# Patient Record
Sex: FEMALE | Race: WHITE | Employment: PART TIME | URBAN - METROPOLITAN AREA
[De-identification: names, ages, dates, MRNs, and addresses within clinical notes are randomized per-mention and may not be internally consistent; named-entity substitution may affect disease eponyms.]

---

## 2020-09-09 ENCOUNTER — INITIAL CONSULT (OUTPATIENT)
Dept: NEUROSURGERY | Age: 20
End: 2020-09-09
Payer: COMMERCIAL

## 2020-09-09 VITALS
TEMPERATURE: 99.3 F | BODY MASS INDEX: 28.25 KG/M2 | DIASTOLIC BLOOD PRESSURE: 71 MMHG | SYSTOLIC BLOOD PRESSURE: 123 MMHG | HEIGHT: 67 IN | WEIGHT: 180 LBS | HEART RATE: 67 BPM

## 2020-09-09 PROCEDURE — 99203 OFFICE O/P NEW LOW 30 MIN: CPT | Performed by: PHYSICIAN ASSISTANT

## 2020-09-09 SDOH — HEALTH STABILITY: MENTAL HEALTH: HOW OFTEN DO YOU HAVE A DRINK CONTAINING ALCOHOL?: NEVER

## 2020-09-09 ASSESSMENT — ENCOUNTER SYMPTOMS
ABDOMINAL PAIN: 0
PHOTOPHOBIA: 0
SHORTNESS OF BREATH: 0
TROUBLE SWALLOWING: 0
BACK PAIN: 1

## 2020-09-09 NOTE — PROGRESS NOTES
Subjective:      Patient ID: Tita Rodriguez is a 21 y.o. female. Jay Jay Coulter is a 21year old female with a past medical history of scoliosis. She presents to the office today with her mother c/o worsening right sided mid back pain. Describes the pain as intermittent, sharp, and burning. Pain radiates underneath her right arm stopping at the elbow. Any type of physical activity makes the pain worse. She has tried physical therapy without significant relief. She has seen an orthopedic surgeon in [facility] who recommended she see the pain clinic and our neurosurgical services. No recent imaging to review. Denies numbness, tingling, loss of bowel or bladder, saddle anesthesia, fever, chills, N/V, SOB, or chest pain. Review of Systems   Constitutional: Negative for fever. HENT: Negative for trouble swallowing. Eyes: Negative for photophobia. Respiratory: Negative for shortness of breath. Cardiovascular: Negative for chest pain. Gastrointestinal: Negative for abdominal pain. Endocrine: Negative for heat intolerance. Genitourinary: Negative for flank pain. Musculoskeletal: Positive for back pain and myalgias. Skin: Negative for wound. Neurological: Negative for weakness, numbness and headaches. Psychiatric/Behavioral: Negative for confusion. Objective:   Physical Exam  Constitutional:       Appearance: Normal appearance. She is well-developed. HENT:      Head: Normocephalic and atraumatic. Eyes:      Extraocular Movements: Extraocular movements intact. Conjunctiva/sclera: Conjunctivae normal.      Pupils: Pupils are equal, round, and reactive to light. Neck:      Musculoskeletal: Normal range of motion and neck supple. Cardiovascular:      Rate and Rhythm: Normal rate. Pulmonary:      Effort: Pulmonary effort is normal.   Abdominal:      General: There is no distension.    Musculoskeletal:      Comments: Tenderness to palpation of right sided cervicothoracic spine  Right thoracic prominence noted   Skin:     General: Skin is warm and dry. Neurological:      Mental Status: She is alert. Comments: Alert and oriented x3  CN3-12 intact  Motor strength full  Sensation intact to light touch  Reflexes normal    Psychiatric:         Thought Content: Thought content normal.         Assessment: This is a 21year old female presenting with worsening right sided thoracic pain. Hx scoliosis. Failed PT.        Plan:      -Obtain MRI thoracic spine and standing scoliosis films for baseline imaging   -OARRS report reviewed  -Call/return to Neurosurgery clinic after completion of imaging to discuss results and further treatment plan  -Call/return sooner if symptoms worsen or new issues arise in the interim           Maulik Perry PA-C

## 2020-09-22 ENCOUNTER — TELEPHONE (OUTPATIENT)
Dept: NEUROSURGERY | Age: 20
End: 2020-09-22

## 2020-09-22 NOTE — TELEPHONE ENCOUNTER
No auth required for MRI of thoracic spine per Kiran Goff with [id number] ( [phone] ).  Ref#  [id number]

## 2020-09-30 ENCOUNTER — HOSPITAL ENCOUNTER (OUTPATIENT)
Dept: MRI IMAGING | Age: 20
Discharge: HOME | End: 2020-10-02
Payer: COMMERCIAL

## 2020-09-30 PROCEDURE — 72146 MRI CHEST SPINE W/O DYE: CPT

## 2020-10-07 ENCOUNTER — HOSPITAL ENCOUNTER (OUTPATIENT)
Dept: GENERAL RADIOLOGY | Age: 20
Discharge: HOME | End: 2020-10-09
Payer: COMMERCIAL

## 2020-10-07 PROCEDURE — 72082 X-RAY EXAM ENTIRE SPI 2/3 VW: CPT

## 2020-10-12 ENCOUNTER — TELEPHONE (OUTPATIENT)
Dept: NEUROSURGERY | Age: 20
End: 2020-10-12

## 2021-05-06 ENCOUNTER — OFFICE VISIT (OUTPATIENT)
Dept: FAMILY MEDICINE CLINIC | Age: 21
End: 2021-05-06
Payer: COMMERCIAL

## 2021-05-06 ENCOUNTER — TELEPHONE (OUTPATIENT)
Dept: FAMILY MEDICINE CLINIC | Age: 21
End: 2021-05-06

## 2021-05-06 VITALS
SYSTOLIC BLOOD PRESSURE: 118 MMHG | HEART RATE: 96 BPM | OXYGEN SATURATION: 98 % | WEIGHT: 194.6 LBS | HEIGHT: 67 IN | DIASTOLIC BLOOD PRESSURE: 76 MMHG | TEMPERATURE: 98.1 F | BODY MASS INDEX: 30.54 KG/M2

## 2021-05-06 DIAGNOSIS — G97.82 OTHER POSTOPERATIVE COMPLICATION INVOLVING NERVOUS SYSTEM: ICD-10-CM

## 2021-05-06 DIAGNOSIS — R43.9 SENSE OF SMELL ALTERED: ICD-10-CM

## 2021-05-06 DIAGNOSIS — R43.2 ALTERED TASTE: Primary | ICD-10-CM

## 2021-05-06 PROCEDURE — 99203 OFFICE O/P NEW LOW 30 MIN: CPT | Performed by: NURSE PRACTITIONER

## 2021-05-06 RX ORDER — NORGESTIMATE AND ETHINYL ESTRADIOL 7DAYSX3 28
KIT ORAL
COMMUNITY
Start: 2021-04-26 | End: 2022-04-27

## 2021-05-06 RX ORDER — PREDNISONE 20 MG/1
20 TABLET ORAL 2 TIMES DAILY
Qty: 10 TABLET | Refills: 0 | Status: SHIPPED | OUTPATIENT
Start: 2021-05-06 | End: 2021-05-11

## 2021-05-06 RX ORDER — ACETAMINOPHEN 500 MG
500 TABLET ORAL EVERY 6 HOURS PRN
COMMUNITY

## 2021-05-06 RX ORDER — PREDNISONE 20 MG/1
20 TABLET ORAL 2 TIMES DAILY
Qty: 10 TABLET | Refills: 0 | Status: SHIPPED
Start: 2021-05-06 | End: 2021-05-06

## 2021-05-06 RX ORDER — VIT C/VIT D3/E/ZINC/ELDERBERRY 65 MG-3.15
TABLET,CHEWABLE ORAL DAILY
COMMUNITY

## 2021-05-06 ASSESSMENT — PATIENT HEALTH QUESTIONNAIRE - PHQ9
SUM OF ALL RESPONSES TO PHQ QUESTIONS 1-9: 0
SUM OF ALL RESPONSES TO PHQ QUESTIONS 1-9: 0
SUM OF ALL RESPONSES TO PHQ9 QUESTIONS 1 & 2: 0
2. FEELING DOWN, DEPRESSED OR HOPELESS: 0

## 2021-05-06 ASSESSMENT — VISUAL ACUITY: OU: 1

## 2021-05-06 ASSESSMENT — ENCOUNTER SYMPTOMS
ABDOMINAL PAIN: 0
ANAL BLEEDING: 0
COLOR CHANGE: 0
BLOOD IN STOOL: 0
RECTAL PAIN: 0

## 2021-05-06 NOTE — TELEPHONE ENCOUNTER
Okay sounds good I did leave a packet of stuff with you just let her know it is there and that I had like to follow-up with her in a week

## 2021-05-06 NOTE — PROGRESS NOTES
Lidia Duarte : 2000 Sex: female  Age: 24 y.o. Chief Complaint   Patient presents with    Other     taste and smell have been strange since her surgery in march, everything taste like metal     New Patient       Assessment and Plan:  There are no diagnoses linked to this encounter. No follow-ups on file. Educational materials printed for patient's review and were included in patient instructions on her After Visit Summary and given to patient at the end of visit. Counseled regarding above diagnosis, including possible risks and complications,  especially if left uncontrolled. Counseled regarding the possible side effects, risks, benefits and alternatives to treatment; patient and/or guardian verbalizes understanding, agrees, feels comfortable with and wishes to proceed with above treatment plan. Advised patient to call with any new medication issues, and read all Rx info from pharmacy to assure aware of all possible risks and side effects of medication before taking. Reviewed age and gender appropriate health screening exams and vaccinations. Advised patient regarding importance of keeping up with recommended health maintenance and to schedule as soon as possible if overdue, as this is important in assessing for undiagnosed pathology, especially cancer, as well as protecting against potentially harmful/life threatening disease. Patient verbalizes understanding and agrees with above counseling, assessment and plan. All questions answered. On 21 I have spent 30 reviewing previous notes, test results and face to face with the patient discussing the diagnosis and importance of compliance with the treatment plan as well as documenting on the day of the visit.   Educational materials exercises printed for patient's review and were included in patient instructions on their After Visit Summary and given to patient at the end of visit.      USPTF:     (  ) HIV: Screening - Adolescents and Adults  Grade: A (Recommended) recommends that clinicians screen for HIV infection in ages 13 to 72 years. (  ) Hepatitis C Virus Infection: Screening--Adults at High Risk and Adults born between 1945 and 1965  Grade: B (Recommended) recommends screening for hepatitis C virus (HCV) infection in persons at high risk for infection. The USPSTF also recommends offering 1-time screening for HCV infection to adults born between 80 and 1965. (Blood pressure 118/76) High Blood Pressure: Screening and Home Monitoring -- Adults  Grade: A (Recommended) recommends screening for high blood pressure in ages 25 years or older. obtain measurements outside of the clinical setting for diagnostic confirmation before starting treatment. Annual screening for adults aged 36 years or older or those who are at increased risk for blood pressure    (  )  Lipid Disorders in Adults: Screening -- Men 28 and Older  Grade: A (Recommended) recommends screening men aged 28 and older for lipid disorders. (Social) Alcohol Misuse: Screening and Behavioral Counseling Interventions in Primary Care -- Adults  Grade: B (Recommended) recommends that clinicians screen adults aged 25 years or older for alcohol misuse and provide persons engaged in risky or hazardous drinking with brief behavioral counseling interventions to reduce alcohol misuse. (  ) Abnormal Blood Glucose and Type 2 Diabetes Mellitus: Screening -- Adults aged 36 to 79 years who are overweight or obese Grade: B (Recommended)    (BMI 30.25)  Obesity: Screening for and Management of-- All Adults  Grade: B(Recommended) recommends screening all adults for obesity. Clinicians should offer or refer patients with a body mass index (BMI) of 30 kg/m2 or higher to intensive, multicomponent behavioral interventions.          (Not a fall risk)  Fall Prevention -- Exercise/Physical Therapy: Community-dwelling Adults 72 Years or Older, Increased Risk for Falls   Grade: B (Recommended) recommends exercise or physical therapy to prevent falls in community-dwelling adults aged 72 years or older who are at increased risk for falls. (No new symptoms noted or reported today)  Depression: Screening -- General adult population, including pregnant and postpartum women  Grade: B(Recommended) recommends screening for depression in the general adult population,  Screening should be implemented with adequate systems in place to ensure accurate diagnosis, effective treatment, and appropriate follow-up. (  ) Thyroid Dysfunction: Screening --      (2020) Cervical Cancer: Screening -- Women 21 to 72 (Pap Smear) or 30-65 (in combo with HPV testing)  Grade: A(Recommended)    (  ) Breast Cancer: Screening with Mammography-- Women aged 48 to 76 years  Grade: B (Recommended) recommends biennial screening mammography for women aged 48 to 76 years. Date of surgery March 17 th, Symptoms started 24 hours after surgery. It has only progressed after that started with smell and taste. Described as moldy food. Was able to tolerate anything dairy. Biggest triggers meat, smoke, Gas Fumes and chemicals like bleach. Was told it should subside within two weeks but it has only progressed. She gets very nauseous and vomits with her biggest triggers. No major weight gain or loss reported.         Med's Used in Anesthesia     sevoflurane (ULTANE) inhalation charge 1 Each  propofol (DIPRIVAN/PROPOVEN) injection 200 mg  propofol (DIPRIVAN/PROPOVEN) continuous infusion Standard Conc 10mg/mL 1,158,235 mcg  fentaNYL (SUBLIMAZE) 50 mcg/mL injection 75 mcg  remifentanil (ULTIVA) 1 mg injection 2,772.33 mcg  midazolam (VERSED) 1 mg/mL IV 2 mg  rocuronium (ZEMURON) 10 mg/ml injection 50 mg  dexamethasone (DECADRON) injection 4 mg/mL 4 mg  ondansetron (ZOFRAN) 2 mg/mL injection 8 mg  ketorolac (TORADOL) IV             15 mg  acetaminophen (OFIRMEV) IV 1,000 mg  lidocaine 1% injection on file    Years of education: Not on file    Highest education level: Not on file   Occupational History    Not on file   Social Needs    Financial resource strain: Not on file    Food insecurity     Worry: Not on file     Inability: Not on file    Transportation needs     Medical: Not on file     Non-medical: Not on file   Tobacco Use    Smoking status: Never Smoker    Smokeless tobacco: Never Used   Substance and Sexual Activity    Alcohol use: Never     Frequency: Never     Binge frequency: Never    Drug use: Never    Sexual activity: Not on file   Lifestyle    Physical activity     Days per week: Not on file     Minutes per session: Not on file    Stress: Not on file   Relationships    Social connections     Talks on phone: Not on file     Gets together: Not on file     Attends Mosque service: Not on file     Active member of club or organization: Not on file     Attends meetings of clubs or organizations: Not on file     Relationship status: Not on file    Intimate partner violence     Fear of current or ex partner: Not on file     Emotionally abused: Not on file     Physically abused: Not on file     Forced sexual activity: Not on file   Other Topics Concern    Not on file   Social History Narrative    Not on file       Vitals:    05/06/21 1311   BP: 118/76   Site: Left Upper Arm   Position: Sitting   Cuff Size: Medium Adult   Pulse: 96   Temp: 98.1 °F (36.7 °C)   TempSrc: Temporal   SpO2: 98%   Weight: 194 lb 9.6 oz (88.3 kg)   Height: 5' 7.25\" (1.708 m)       Physical Exam  Constitutional:       Appearance: Normal appearance. She is normal weight. HENT:      Head: Normocephalic and atraumatic. Right Ear: Tympanic membrane and ear canal normal. No decreased hearing noted. No drainage or tenderness. No middle ear effusion. There is no impacted cerumen. No foreign body. Tympanic membrane is not injected, scarred, perforated or erythematous.       Left Ear: Tympanic membrane normal. No decreased hearing noted. No drainage or tenderness. No middle ear effusion. There is no impacted cerumen. No foreign body. Tympanic membrane is not injected, scarred, perforated or erythematous. Nose: No congestion or rhinorrhea. Right Sinus: No maxillary sinus tenderness or frontal sinus tenderness. Left Sinus: No maxillary sinus tenderness or frontal sinus tenderness. Mouth/Throat:      Mouth: Mucous membranes are dry. Pharynx: Oropharynx is clear. No oropharyngeal exudate or posterior oropharyngeal erythema. Eyes:      General: Lids are normal. Vision grossly intact. No allergic shiner. Right eye: No discharge. Left eye: No discharge. Extraocular Movements: Extraocular movements intact. Right eye: No nystagmus. Left eye: No nystagmus. Conjunctiva/sclera: Conjunctivae normal.      Right eye: Right conjunctiva is not injected. No exudate or hemorrhage. Left eye: Left conjunctiva is not injected. No exudate or hemorrhage. Pupils: Pupils are equal, round, and reactive to light. Neck:      Musculoskeletal: Normal range of motion. No neck rigidity or muscular tenderness. Thyroid: No thyromegaly. Meningeal: Brudzinski's sign and Kernig's sign absent. Cardiovascular:      Rate and Rhythm: Normal rate and regular rhythm. Heart sounds: Normal heart sounds. Pulmonary:      Effort: No retractions. Breath sounds: No decreased breath sounds, wheezing, rhonchi or rales. Abdominal:      Tenderness: There is no abdominal tenderness. Musculoskeletal: Normal range of motion. Lymphadenopathy:      Cervical: No cervical adenopathy. Right cervical: No superficial, deep or posterior cervical adenopathy. Left cervical: No superficial, deep or posterior cervical adenopathy. Skin:     General: Skin is warm. Neurological:      Mental Status: She is alert.               Seen By:  Syliva Kawasaki, APRN - [facility]

## 2021-05-12 ENCOUNTER — OFFICE VISIT (OUTPATIENT)
Dept: FAMILY MEDICINE CLINIC | Age: 21
End: 2021-05-12
Payer: COMMERCIAL

## 2021-05-12 VITALS
HEIGHT: 67 IN | DIASTOLIC BLOOD PRESSURE: 80 MMHG | OXYGEN SATURATION: 98 % | BODY MASS INDEX: 30.26 KG/M2 | WEIGHT: 192.8 LBS | HEART RATE: 99 BPM | SYSTOLIC BLOOD PRESSURE: 118 MMHG | TEMPERATURE: 97.1 F

## 2021-05-12 DIAGNOSIS — G97.82 OTHER POSTOPERATIVE COMPLICATION INVOLVING NERVOUS SYSTEM: ICD-10-CM

## 2021-05-12 DIAGNOSIS — R43.2 ALTERED TASTE: Primary | ICD-10-CM

## 2021-05-12 DIAGNOSIS — R43.9 SENSE OF SMELL ALTERED: ICD-10-CM

## 2021-05-12 PROCEDURE — 96372 THER/PROPH/DIAG INJ SC/IM: CPT | Performed by: NURSE PRACTITIONER

## 2021-05-12 PROCEDURE — 99213 OFFICE O/P EST LOW 20 MIN: CPT | Performed by: NURSE PRACTITIONER

## 2021-05-12 RX ORDER — TRIAMCINOLONE ACETONIDE 40 MG/ML
40 INJECTION, SUSPENSION INTRA-ARTICULAR; INTRAMUSCULAR ONCE
Status: COMPLETED | OUTPATIENT
Start: 2021-05-12 | End: 2021-05-12

## 2021-05-12 RX ADMIN — TRIAMCINOLONE ACETONIDE 40 MG: 40 INJECTION, SUSPENSION INTRA-ARTICULAR; INTRAMUSCULAR at 09:50

## 2021-05-12 ASSESSMENT — ENCOUNTER SYMPTOMS
ABDOMINAL PAIN: 0
ANAL BLEEDING: 0
RECTAL PAIN: 0
BLOOD IN STOOL: 0
COLOR CHANGE: 0

## 2021-05-12 ASSESSMENT — VISUAL ACUITY: OU: 1

## 2021-05-12 NOTE — PROGRESS NOTES
Jessika Smileya : 2000 Sex: female  Age: 24 y.o. Chief Complaint   Patient presents with    Other     one week follow up on her altered taste, she states she has noticed some improvement . Assessment and Plan:  Ev Barrera was seen today for other. Diagnoses and all orders for this visit:    Altered taste  Comments: We will give her 1 cc of Kenalog 40 and see if this continues to improve and diminish her sensitivity to smells and taste    Sense of smell altered    Other postoperative complication involving nervous system    Other orders  -     triamcinolone acetonide (KENALOG-40) injection 40 mg        Return if symptoms worsen or fail to improve. Educational materials printed for patient's review and were included in patient instructions on her After Visit Summary and given to patient at the end of visit. Counseled regarding above diagnosis, including possible risks and complications,  especially if left uncontrolled. Counseled regarding the possible side effects, risks, benefits and alternatives to treatment; patient and/or guardian verbalizes understanding, agrees, feels comfortable with and wishes to proceed with above treatment plan. Advised patient to call with any new medication issues, and read all Rx info from pharmacy to assure aware of all possible risks and side effects of medication before taking. Reviewed age and gender appropriate health screening exams and vaccinations. Advised patient regarding importance of keeping up with recommended health maintenance and to schedule as soon as possible if overdue, as this is important in assessing for undiagnosed pathology, especially cancer, as well as protecting against potentially harmful/life threatening disease. Patient verbalizes understanding and agrees with above counseling, assessment and plan. All questions answered.     On 21 I have spent 30 reviewing previous notes, test results and face to face with the patient discussing the diagnosis and importance of compliance with the treatment plan as well as documenting on the day of the visit. Educational materials exercises printed for patient's review and were included in patient instructions on their After Visit Summary and given to patient at the end of visit.      USPTF:     (  ) HIV: Screening - Adolescents and Adults  Grade: A (Recommended) recommends that clinicians screen for HIV infection in ages 13 to 72 years. (  ) Hepatitis C Virus Infection: Screening--Adults at High Risk and Adults born between 1945 and 1965  Grade: B (Recommended) recommends screening for hepatitis C virus (HCV) infection in persons at high risk for infection. The USPSTF also recommends offering 1-time screening for HCV infection to adults born between 80 and 1965. (Blood pressure 118/80) High Blood Pressure: Screening and Home Monitoring -- Adults  Grade: A (Recommended) recommends screening for high blood pressure in ages 25 years or older. obtain measurements outside of the clinical setting for diagnostic confirmation before starting treatment. Annual screening for adults aged 36 years or older or those who are at increased risk for blood pressure    (  )  Lipid Disorders in Adults: Screening -- Men 28 and Older  Grade: A (Recommended) recommends screening men aged 28 and older for lipid disorders. (Social) Alcohol Misuse: Screening and Behavioral Counseling Interventions in Primary Care -- Adults  Grade: B (Recommended) recommends that clinicians screen adults aged 25 years or older for alcohol misuse and provide persons engaged in risky or hazardous drinking with brief behavioral counseling interventions to reduce alcohol misuse.      (  ) Abnormal Blood Glucose and Type 2 Diabetes Mellitus: Screening -- Adults aged 36 to 79 years who are overweight or obese Grade: B (Recommended)    (BMI 30.25)  Obesity: Screening for and Management of-- All Adults  Grade: B(Recommended) recommends screening all adults for obesity. Clinicians should offer or refer patients with a body mass index (BMI) of 30 kg/m2 or higher to intensive, multicomponent behavioral interventions. (Not a fall risk)  Fall Prevention -- Exercise/Physical Therapy: Community-dwelling Adults 72 Years or Older, Increased Risk for Falls   Grade: B (Recommended) recommends exercise or physical therapy to prevent falls in community-dwelling adults aged 72 years or older who are at increased risk for falls. (No new symptoms noted or reported today)  Depression: Screening -- General adult population, including pregnant and postpartum women  Grade: B(Recommended) recommends screening for depression in the general adult population,  Screening should be implemented with adequate systems in place to ensure accurate diagnosis, effective treatment, and appropriate follow-up. (  ) Thyroid Dysfunction: Screening --      (2020) Cervical Cancer: Screening -- Women 21 to 72 (Pap Smear) or 30-65 (in combo with HPV testing)  Grade: A(Recommended)    (  ) Breast Cancer: Screening with Mammography-- Women aged 48 to 76 years  Grade: B (Recommended) recommends biennial screening mammography for women aged 48 to 76 years. After only 1 course of prednisone she is noticing a big improvement in her triggers. No new symptomology reported. Date of surgery March 17 th, Symptoms started 24 hours after surgery. It has only progressed after that started with smell and taste. Described as moldy food. Was able to tolerate anything dairy. Biggest triggers meat, smoke, Gas Fumes and chemicals like bleach. Was told it should subside within two weeks but it has only progressed. She gets very nauseous and vomits with her biggest triggers. No major weight gain or loss reported.         Med's Used in Anesthesia     sevoflurane (ULTANE) inhalation charge 1 Each  propofol (DIPRIVAN/PROPOVEN) injection 200 mg  propofol (DIPRIVAN/PROPOVEN) continuous infusion Standard Conc 10mg/mL 1,158,235 mcg  fentaNYL (SUBLIMAZE) 50 mcg/mL injection 75 mcg  remifentanil (ULTIVA) 1 mg injection 2,772.33 mcg  midazolam (VERSED) 1 mg/mL IV 2 mg  rocuronium (ZEMURON) 10 mg/ml injection 50 mg  dexamethasone (DECADRON) injection 4 mg/mL 4 mg  ondansetron (ZOFRAN) 2 mg/mL injection 8 mg  ketorolac (TORADOL) IV             15 mg  acetaminophen (OFIRMEV) IV 1,000 mg  lidocaine 1% injection                         50 mg  ceFAZolin (ANCEF) 1000 mg vial 4,000 mg  phenylephrine 10 mg/mL injection 280 mcg  calcium chloride 10% (100 mg/mL) injection 1,000 mg  morphine (PF) 1mg/ml injection 450 mcg  morphine (PF) 1mg/ml injection 4 mg  glycopyrrolate (ROBINUL)                  2 mg/mL injection    0.2 mcg  neostigmine (BLOXIVERZ)                 1 mg/mL injection    1 mg  dexmedetomidine (PRECEDEX) 4 mcg/mL injection               12 mcg    Other  Pertinent negatives include no abdominal pain, chest pain, fever or headaches. Review of Systems   Constitutional: Negative for activity change and fever. HENT: Negative for nosebleeds. Cardiovascular: Negative for chest pain, palpitations and leg swelling. Gastrointestinal: Negative for abdominal pain, anal bleeding, blood in stool and rectal pain. Genitourinary: Negative for hematuria. Skin: Negative for color change and pallor. Neurological: Negative for light-headedness and headaches. Hematological: Does not bruise/bleed easily.          Current Outpatient Medications:     acetaminophen (TYLENOL) 500 MG tablet, Take 500 mg by mouth every 6 hours as needed for Pain, Disp: , Rfl:     Misc Natural Products (AIRBORNE ELDERBERRY) CHEW, Take by mouth daily, Disp: , Rfl:     Ascorbic Acid (VITAMIN C) 500 MG CHEW, Take by mouth daily, Disp: , Rfl:     Norgestim-Eth Estrad Triphasic 0.18/0.215/0.25 MG-35 MCG TABS, take 1 tablet by mouth once daily, Disp: , Rfl:   Allergies   Allergen Reactions    Morphine Nausea And Vomiting       History reviewed. No pertinent past medical history.   Past Surgical History:   Procedure Laterality Date    SPINAL FUSION  03/17/2021     Family History   Problem Relation Age of Onset    Rheum Arthritis Mother     Rheum Arthritis Father     Rashes/Skin Problems Brother      Social History     Socioeconomic History    Marital status: Single     Spouse name: Not on file    Number of children: Not on file    Years of education: Not on file    Highest education level: Not on file   Occupational History    Not on file   Social Needs    Financial resource strain: Not on file    Food insecurity     Worry: Not on file     Inability: Not on file    Transportation needs     Medical: Not on file     Non-medical: Not on file   Tobacco Use    Smoking status: Never Smoker    Smokeless tobacco: Never Used   Substance and Sexual Activity    Alcohol use: Never     Frequency: Never     Binge frequency: Never    Drug use: Never    Sexual activity: Not on file   Lifestyle    Physical activity     Days per week: Not on file     Minutes per session: Not on file    Stress: Not on file   Relationships    Social connections     Talks on phone: Not on file     Gets together: Not on file     Attends Bahai service: Not on file     Active member of club or organization: Not on file     Attends meetings of clubs or organizations: Not on file     Relationship status: Not on file    Intimate partner violence     Fear of current or ex partner: Not on file     Emotionally abused: Not on file     Physically abused: Not on file     Forced sexual activity: Not on file   Other Topics Concern    Not on file   Social History Narrative    Not on file       Vitals:    05/12/21 0909   BP: 118/80   Site: Left Upper Arm   Position: Sitting   Cuff Size: Medium Adult   Pulse: 99   Temp: 97.1 °F (36.2 °C)   TempSrc: Temporal   SpO2: 98%   Weight: 192 lb 12.8 oz (87.5 kg)   Height: 5' 7.25\" (1.708 m)       Physical Exam  Constitutional:       Appearance: Normal appearance. She is normal weight. HENT:      Head: Normocephalic and atraumatic. Right Ear: Tympanic membrane and ear canal normal. No decreased hearing noted. No drainage or tenderness. No middle ear effusion. There is no impacted cerumen. No foreign body. Tympanic membrane is not injected, scarred, perforated or erythematous. Left Ear: Tympanic membrane normal. No decreased hearing noted. No drainage or tenderness. No middle ear effusion. There is no impacted cerumen. No foreign body. Tympanic membrane is not injected, scarred, perforated or erythematous. Nose: No congestion or rhinorrhea. Right Sinus: No maxillary sinus tenderness or frontal sinus tenderness. Left Sinus: No maxillary sinus tenderness or frontal sinus tenderness. Mouth/Throat:      Mouth: Mucous membranes are dry. Pharynx: Oropharynx is clear. No oropharyngeal exudate or posterior oropharyngeal erythema. Eyes:      General: Lids are normal. Vision grossly intact. No allergic shiner. Right eye: No discharge. Left eye: No discharge. Extraocular Movements: Extraocular movements intact. Right eye: No nystagmus. Left eye: No nystagmus. Conjunctiva/sclera: Conjunctivae normal.      Right eye: Right conjunctiva is not injected. No exudate or hemorrhage. Left eye: Left conjunctiva is not injected. No exudate or hemorrhage. Pupils: Pupils are equal, round, and reactive to light. Neck:      Musculoskeletal: Normal range of motion. No neck rigidity or muscular tenderness. Thyroid: No thyromegaly. Meningeal: Brudzinski's sign and Kernig's sign absent. Cardiovascular:      Rate and Rhythm: Normal rate and regular rhythm. Heart sounds: Normal heart sounds. Pulmonary:      Effort: No retractions. Breath sounds: No decreased breath sounds, wheezing, rhonchi or rales.    Abdominal:

## 2021-05-12 NOTE — PATIENT INSTRUCTIONS
your thyroid or adrenal gland. Tell your doctor if you are pregnant or breastfeeding. How is triamcinolone injection given? Triamcinolone injection is given through a needle and can be injected into different areas of the body: into a muscle, into the space around a joint or tendon, or into a lesion on the skin. A healthcare provider will give you this injection. Not every brand of this medicine is used for the same conditions or injected into the same body areas. Some brands are given only one time as needed. Others may be given at regular intervals. Carefully follow your doctor's dosing instructions. Triamcinolone can weaken (suppress) your immune system, and you may get an infection more easily. Call your doctor if you have unusual bruising or bleeding, or signs of infection (fever, weakness, cold or flu symptoms, skin sores, diarrhea, frequent or recurring illness). Long-term use of steroids can cause harmful effects on the eyes. If you receive triamcinolone injection for longer than 6 weeks, your doctor may want you to have regular eye exams. Your doctor may instruct you to limit your salt intake while you are receiving triamcinolone injection. You may also need to take potassium supplements. Follow all instructions. This medicine can affect the results of certain medical tests. Tell any doctor who treats you that you are using triamcinolone. You should not stop using triamcinolone suddenly after long-term repeated use, or you could have unpleasant withdrawal symptoms. Ask your doctor how to safely stop using this medicine. What happens if I miss a dose? Call your doctor for instructions if you miss an appointment for a scheduled triamcinolone injection. When triamcinolone is used as a single dose, you will not be on a regular dosing schedule. What happens if I overdose? Since this medicine is given by a healthcare professional in a medical setting, an overdose is unlikely to occur.   Using too much triamcinolone is not likely to cause serious problems. However, long term use of high doses can lead to thinning skin, easy bruising, changes in body fat (especially in your face, neck, back, and waist), increased acne or facial hair, menstrual problems, impotence, or loss of interest in sex. What should I avoid while receiving triamcinolone injection? After injection of triamcinolone into a joint, avoid overusing that joint through strenuous activity or high-impact sports. You could cause damage to the joint. Avoid being near people who are sick or have infections. Call your doctor for preventive treatment if you are exposed to chickenpox or measles. These conditions can be serious or even fatal in people who are using triamcinolone. Do not receive a \"live\" vaccine or a toxoid vaccine while using triamcinolone, or you could develop a serious infection. Live vaccines include measles, mumps, rubella (MMR), polio, rotavirus, typhoid, yellow fever, varicella (chickenpox), zoster (shingles), and nasal flu (influenza) vaccine. Toxoid vaccines include diphtheria-tetanus toxoid (DT or Td). What are the possible side effects of triamcinolone injection? Get emergency medical help if you have signs of an allergic reaction: hives; difficult breathing; swelling of your face, lips, tongue, or throat.   Call your doctor at once if you have:  · (after injection into a joint space) increased pain or swelling, joint stiffness, fever, and general ill feeling;  · blurred vision, tunnel vision, eye pain, or seeing halos around lights;  · unusual changes in mood or behavior;  · swelling, rapid weight gain, feeling short of breath;  · stomach cramps, vomiting, diarrhea, bloody or tarry stools, rectal irritation;  · sudden numbness or weakness (especially on one side of the body);  · a seizure (convulsions);  · severe headache, blurred vision, pounding in your neck or ears;  · increased pressure inside the skull --severe includes prescription and over-the-counter medicines, vitamins, and herbal products. Not all possible drug interactions are listed here. Where can I get more information? Your doctor or pharmacist can provide more information about triamcinolone injection. Remember, keep this and all other medicines out of the reach of children, never share your medicines with others, and use this medication only for the indication prescribed. Every effort has been made to ensure that the information provided by [address] is accurate, up-to-date, and complete, but no guarantee is made to that effect. Drug information contained herein may be time sensitive. [facility] information has been compiled for use by healthcare practitioners and consumers in the [place] and therefore [facility] does not warrant that uses outside of the [place] are appropriate, unless specifically indicated otherwise. [facility]'s drug information does not endorse drugs, diagnose patients or recommend therapy. [facility]'s drug information is an informational resource designed to assist licensed healthcare practitioners in caring for their patients and/or to serve consumers viewing this service as a supplement to, and not a substitute for, the expertise, skill, knowledge and judgment of healthcare practitioners. The absence of a warning for a given drug or drug combination in no way should be construed to indicate that the drug or drug combination is safe, effective or appropriate for any given patient. [facility] does not assume any responsibility for any aspect of healthcare administered with the aid of information [facility] provides. The information contained herein is not intended to cover all possible uses, directions, precautions, warnings, drug interactions, allergic reactions, or adverse effects.  If you have questions about the drugs you are taking, check with your doctor, nurse or pharmacist.  Copyright 5186-5434 [facility] [place]

## 2021-08-16 ENCOUNTER — OFFICE VISIT (OUTPATIENT)
Dept: ENT CLINIC | Age: 21
End: 2021-08-16
Payer: COMMERCIAL

## 2021-08-16 VITALS
HEIGHT: 68 IN | WEIGHT: 190 LBS | BODY MASS INDEX: 28.79 KG/M2 | DIASTOLIC BLOOD PRESSURE: 73 MMHG | SYSTOLIC BLOOD PRESSURE: 110 MMHG

## 2021-08-16 DIAGNOSIS — R43.2 DYSGEUSIA: ICD-10-CM

## 2021-08-16 DIAGNOSIS — R43.1 PARAOSMIA: Primary | ICD-10-CM

## 2021-08-16 PROCEDURE — 99204 OFFICE O/P NEW MOD 45 MIN: CPT | Performed by: OTOLARYNGOLOGY

## 2021-08-16 RX ORDER — FLUTICASONE PROPIONATE 50 MCG
2 SPRAY, SUSPENSION (ML) NASAL DAILY
Qty: 1 BOTTLE | Refills: 3 | Status: SHIPPED
Start: 2021-08-16 | End: 2022-04-27

## 2021-08-16 RX ORDER — CLINDAMYCIN HYDROCHLORIDE 300 MG/1
CAPSULE ORAL
COMMUNITY
Start: 2021-08-04 | End: 2022-04-27

## 2021-08-16 ASSESSMENT — ENCOUNTER SYMPTOMS
EYES NEGATIVE: 1
ALLERGIC/IMMUNOLOGIC NEGATIVE: 1
RESPIRATORY NEGATIVE: 1

## 2021-08-16 NOTE — PROGRESS NOTES
Department of Otolaryngology  Office Consult Note  8/16/21          Subjective:        Chief Complaint:  had concerns including New Patient (patient had surgery states that since the surgery everything smells like garbage, ). Patient ID: Julian Rodriguez is a 24 y.o. female. HPI: Patient presents as  new patient for change of taste and smell. After having spinal fusion thoracic spine for scoliosis in 3/2021 hours after awaking her smell and taste have been distorted. She has certain triggers of foods such as hot temperature foods, garlic, meats, fish that cause her to become nauseous, and gets metallic taste in mouth. She reports vegetables taste like metals, chemicals in her mouth. She has been tried on prednisone as well as kenalog shot with no improvement. She was covid tested many times and negative. She denies hx of current sinusitis, salty nasal drainage. She is able to smell and taste but it is significantly altered. Headaches daily since surgery. Denies blurry vision,changein vision, nasal congestion, seasonal allergies, fever, chills, night sweats, hearing changes, vertigo. She has followed with her neurosurgeon who referred her here. She has had no workup prior. 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 Review of Systems   Constitutional: Negative. HENT: Positive for congestion. Eyes: Negative. Respiratory: Negative. Skin: Negative. Allergic/Immunologic: Negative. Neurological: Negative. Hematological: Negative. Psychiatric/Behavioral: Negative. All other systems reviewed and are negative. History reviewed. No pertinent past medical history.   Past Surgical History:   Procedure Laterality Date    SPINAL FUSION  03/17/2021       Current Outpatient Medications:     clindamycin (CLEOCIN) 300 MG capsule, take 1 capsule by mouth three times a day, Disp: , Rfl:     fluticasone (FLONASE) 50 MCG/ACT nasal spray, 2 sprays by Nasal route daily Use in both nostrils. , Disp: 1 Bottle, Rfl: 3    acetaminophen (TYLENOL) 500 MG tablet, Take 500 mg by mouth every 6 hours as needed for Pain, Disp: , Rfl:     Misc Natural Products (AIRBORNE ELDERBERRY) CHEW, Take by mouth daily, Disp: , Rfl:     Ascorbic Acid (VITAMIN C) 500 MG CHEW, Take by mouth daily, Disp: , Rfl:     Norgestim-Eth Estrad Triphasic 0.18/0.215/0.25 MG-35 MCG TABS, take 1 tablet by mouth once daily, Disp: , Rfl:   Morphine  Social History     Tobacco Use    Smoking status: Never Smoker    Smokeless tobacco: Never Used   Vaping Use    Vaping Use: Never used   Substance Use Topics    Alcohol use: Never    Drug use: Never     Family History   Problem Relation Age of Onset    Rheum Arthritis Mother     Rheum Arthritis Father     Rashes/Skin Problems Brother            Objective:   /73   Ht 5' 7.5\" (1.715 m)   Wt 190 lb (86.2 kg)   BMI 29.32 kg/m²     Physical Exam  Vitals reviewed. Constitutional:       Appearance: Normal appearance. HENT:      Head: Normocephalic. Right Ear: Tympanic membrane, ear canal and external ear normal.      Left Ear: Tympanic membrane, ear canal and external ear normal.      Nose: Congestion and rhinorrhea present. Rhinorrhea is clear. Mouth/Throat:      Mouth: Mucous membranes are moist.      Pharynx: Oropharynx is clear. Uvula midline. Eyes:      Conjunctiva/sclera: Conjunctivae normal.   Cardiovascular:      Rate and Rhythm: Normal rate. Pulses: Normal pulses. Pulmonary:      Effort: Pulmonary effort is normal.   Musculoskeletal:      Cervical back: Normal range of motion and neck supple. Lymphadenopathy:      Cervical: No cervical adenopathy. Skin:     Capillary Refill: Capillary refill takes less than 2 seconds. Neurological:      Mental Status: She is alert and oriented to person, place, and time. Assessment:       Diagnosis Orders   1.  Paraosmia CT SINUS WO CONTRAST   2. Dysgeusia  CT SINUS WO CONTRAST           Plan:        Paraosmia, Dysgeusia  -symptoms are triggered and not constant, onset since spinal surgery without hx of constant nasal drainage or rhinorrhea   -will order CT sinus for further evaluation  -will start her on Flonase as well daily     Follow up with ct results    Chelita Sanderson DO  Resident Physician  [facility]  Otolaryngology Residency  8/16/2021  10:05 AM    Electronically signed by Devika Larkin DO on 8/16/21 at9:41 AM EDT            Bianca Agarwal  2000      I have discussed the case, including pertinent history and exam findings with the resident. I have seen and examined the patient and the key elements of the encounter have been performed by me. I agree with the assessment, plan and orders as documented by the resident. Patient here for follow up of medical problems. Remainder of medical problems as per resident note.       [address],   8/26/21

## 2021-09-08 ENCOUNTER — HOSPITAL ENCOUNTER (OUTPATIENT)
Dept: CT IMAGING | Age: 21
Discharge: HOME | End: 2021-09-10
Payer: COMMERCIAL

## 2021-09-08 DIAGNOSIS — R43.1 PARAOSMIA: ICD-10-CM

## 2021-09-08 DIAGNOSIS — R43.2 DYSGEUSIA: ICD-10-CM

## 2021-09-08 PROCEDURE — 70486 CT MAXILLOFACIAL W/O DYE: CPT

## 2021-09-17 ENCOUNTER — TELEPHONE (OUTPATIENT)
Dept: ENT CLINIC | Age: 21
End: 2021-09-17

## 2021-09-17 NOTE — TELEPHONE ENCOUNTER
Pt left a message that she had a ct scan done last Thursday and has not heard anything about it . Wants to know if thre is anything she needs to know before her return appointment? Does she have to wait until her appointment?

## 2021-09-20 ENCOUNTER — OFFICE VISIT (OUTPATIENT)
Dept: ENT CLINIC | Age: 21
End: 2021-09-20
Payer: COMMERCIAL

## 2021-09-20 VITALS
HEIGHT: 67 IN | DIASTOLIC BLOOD PRESSURE: 63 MMHG | SYSTOLIC BLOOD PRESSURE: 106 MMHG | BODY MASS INDEX: 30.76 KG/M2 | HEART RATE: 78 BPM | WEIGHT: 196 LBS

## 2021-09-20 DIAGNOSIS — R43.1 PARAOSMIA: Primary | ICD-10-CM

## 2021-09-20 PROCEDURE — 99213 OFFICE O/P EST LOW 20 MIN: CPT | Performed by: OTOLARYNGOLOGY

## 2021-09-20 RX ORDER — OMEPRAZOLE 40 MG/1
40 CAPSULE, DELAYED RELEASE ORAL DAILY
Qty: 90 CAPSULE | Refills: 0 | Status: SHIPPED
Start: 2021-09-20 | End: 2022-04-27

## 2021-09-20 NOTE — PROGRESS NOTES
[facility] Otolaryngology  Dr. Roxann Sever. Bunevich, D.O. Ms.Ed. New Consult       Patient Name:  dAonay Jon  :  2000     CHIEF C/O:    Chief Complaint   Patient presents with    Follow-up     1mo f/u CT results       HISTORY OBTAINED FROM:  patient    HISTORY OF PRESENT ILLNESS:       Mckay Loco is a 24y.o. year old female, here today for history of derangements of smell and unusual smell, underwent CT scan finding of suggestive of right-sided paranasal sinus disease is mild. Patient denies any new current complaints of headaches or vision changes, no current complaints of fever chills no sore throat nausea vomiting chest pain at this time. No past medical history on file. Past Surgical History:   Procedure Laterality Date    SPINAL FUSION  2021       Current Outpatient Medications:     omeprazole (PRILOSEC) 40 MG delayed release capsule, Take 1 capsule by mouth daily, Disp: 90 capsule, Rfl: 0    clindamycin (CLEOCIN) 300 MG capsule, take 1 capsule by mouth three times a day, Disp: , Rfl:     fluticasone (FLONASE) 50 MCG/ACT nasal spray, 2 sprays by Nasal route daily Use in both nostrils. , Disp: 1 Bottle, Rfl: 3    acetaminophen (TYLENOL) 500 MG tablet, Take 500 mg by mouth every 6 hours as needed for Pain, Disp: , Rfl:     Misc Natural Products (AIRBORNE ELDERBERRY) CHEW, Take by mouth daily, Disp: , Rfl:     Ascorbic Acid (VITAMIN C) 500 MG CHEW, Take by mouth daily, Disp: , Rfl:     Norgestim-Eth Estrad Triphasic 0.18/0.215/0.25 MG-35 MCG TABS, take 1 tablet by mouth once daily, Disp: , Rfl:   Morphine  Social History     Tobacco Use    Smoking status: Never Smoker    Smokeless tobacco: Never Used   Vaping Use    Vaping Use: Never used   Substance Use Topics    Alcohol use: Never    Drug use: Never     Family History   Problem Relation Age of Onset    Rheum Arthritis Mother     Rheum Arthritis Father     Rashes/Skin Problems Brother        Review of Systems   Constitutional: Negative for chills and fever. HENT: Positive for congestion. Negative for ear discharge, hearing loss, postnasal drip and rhinorrhea. Respiratory: Negative for cough and shortness of breath. Cardiovascular: Negative for chest pain and palpitations. Gastrointestinal: Negative for vomiting. Skin: Negative for rash. Allergic/Immunologic: Negative for environmental allergies. Neurological: Negative for dizziness and headaches. Hematological: Does not bruise/bleed easily. All other systems reviewed and are negative. /63 (Site: Left Upper Arm, Position: Sitting, Cuff Size: Large Adult)   Pulse 78   Ht 5' 7.25\" (1.708 m)   Wt 196 lb (88.9 kg)   BMI 30.47 kg/m²   Physical Exam  Vitals and nursing note reviewed. Constitutional:       Appearance: She is well-developed. HENT:      Head: Normocephalic and atraumatic. Right Ear: Tympanic membrane normal.      Left Ear: Tympanic membrane normal.      Nose: Congestion present. No rhinorrhea. Mouth/Throat:      Mouth: Mucous membranes are dry. Eyes:      Pupils: Pupils are equal, round, and reactive to light. Neck:      Thyroid: No thyromegaly. Trachea: No tracheal deviation. Cardiovascular:      Rate and Rhythm: Normal rate. Pulmonary:      Effort: Pulmonary effort is normal. No respiratory distress. Musculoskeletal:         General: Normal range of motion. Cervical back: Normal range of motion. Lymphadenopathy:      Cervical: No cervical adenopathy. Skin:     General: Skin is warm. Findings: No erythema. Neurological:      Mental Status: She is alert. Cranial Nerves: No cranial nerve deficit. IMPRESSION/PLAN:  Patient seen and examined, consider possible LPR as a source continue saline rinses, consider possible in office surgical intervention repeat examination for mild to moderate sinus disease is unlikely causing her derangement and smell at this time. Dr. Aundrea Yoo D.O.  Ms. Mariano.  Otolaryngology Facial Plastic Surgery  :[facility] Otolaryngology/Facial Plastic Surgery Residency  Associate Clinical Professor:  Deb Romo [facility]

## 2021-10-03 ASSESSMENT — ENCOUNTER SYMPTOMS
SHORTNESS OF BREATH: 0
RHINORRHEA: 0
COUGH: 0
VOMITING: 0

## 2021-10-20 ENCOUNTER — OFFICE VISIT (OUTPATIENT)
Dept: FAMILY MEDICINE CLINIC | Age: 21
End: 2021-10-20
Payer: COMMERCIAL

## 2021-10-20 VITALS
OXYGEN SATURATION: 100 % | SYSTOLIC BLOOD PRESSURE: 122 MMHG | HEART RATE: 106 BPM | BODY MASS INDEX: 31.03 KG/M2 | DIASTOLIC BLOOD PRESSURE: 82 MMHG | WEIGHT: 197.7 LBS | TEMPERATURE: 98.2 F | HEIGHT: 67 IN

## 2021-10-20 DIAGNOSIS — G97.82 OTHER POSTOPERATIVE COMPLICATION INVOLVING NERVOUS SYSTEM: ICD-10-CM

## 2021-10-20 DIAGNOSIS — F41.9 ANXIETY: ICD-10-CM

## 2021-10-20 DIAGNOSIS — R43.2 ALTERED TASTE: Primary | ICD-10-CM

## 2021-10-20 DIAGNOSIS — R43.9 SENSE OF SMELL ALTERED: ICD-10-CM

## 2021-10-20 DIAGNOSIS — F33.0 MILD EPISODE OF RECURRENT MAJOR DEPRESSIVE DISORDER (HCC): ICD-10-CM

## 2021-10-20 PROCEDURE — 99213 OFFICE O/P EST LOW 20 MIN: CPT | Performed by: NURSE PRACTITIONER

## 2021-10-20 RX ORDER — ONDANSETRON 4 MG/1
4 TABLET, FILM COATED ORAL DAILY PRN
Qty: 30 TABLET | Refills: 0 | Status: SHIPPED
Start: 2021-10-20 | End: 2021-12-21 | Stop reason: SDUPTHER

## 2021-10-20 RX ORDER — ESCITALOPRAM OXALATE 10 MG/1
10 TABLET ORAL DAILY
Qty: 30 TABLET | Refills: 5 | Status: SHIPPED
Start: 2021-10-20 | End: 2021-12-21 | Stop reason: SDUPTHER

## 2021-10-20 ASSESSMENT — ENCOUNTER SYMPTOMS
COLOR CHANGE: 0
ANAL BLEEDING: 0
RECTAL PAIN: 0
ABDOMINAL PAIN: 1
BLOOD IN STOOL: 0

## 2021-10-20 ASSESSMENT — VISUAL ACUITY: OU: 1

## 2021-10-20 NOTE — PROGRESS NOTES
Erich Amber : 2000 Sex: female  Age: 24 y.o. Chief Complaint   Patient presents with    Abdominal Pain     abdominal pain and nausea, been going on since the altered taste but this has been worse the last month, she has thrown up, she is also having a lot of memory fog        Assessment and Plan:  Daphne Liang was seen today for abdominal pain. Diagnoses and all orders for this visit:    Altered taste  -     External Referral To Psychiatry    Sense of smell altered  -     External Referral To Psychiatry    Other postoperative complication involving nervous system  -     External Referral To Psychiatry    Mild episode of recurrent major depressive disorder [facility])  -     External Referral To Psychiatry    Anxiety  -     External Referral To Psychiatry    Other orders  -     ondansetron (ZOFRAN) 4 MG tablet; Take 1 tablet by mouth daily as needed for Nausea or Vomiting  -     escitalopram (LEXAPRO) 10 MG tablet; Take 1 tablet by mouth daily        Return in about 8 weeks (around 12/15/2021). Educational materials printed for patient's review and were included in patient instructions on her After Visit Summary and given to patient at the end of visit. Counseled regarding above diagnosis, including possible risks and complications,  especially if left uncontrolled. Counseled regarding the possible side effects, risks, benefits and alternatives to treatment; patient and/or guardian verbalizes understanding, agrees, feels comfortable with and wishes to proceed with above treatment plan. Advised patient to call with any new medication issues, and read all Rx info from pharmacy to assure aware of all possible risks and side effects of medication before taking. Reviewed age and gender appropriate health screening exams and vaccinations.   Advised patient regarding importance of keeping up with recommended health maintenance and to schedule as soon as possible if overdue, as this is important in assessing for undiagnosed pathology, especially cancer, as well as protecting against potentially harmful/life threatening disease. Patient verbalizes understanding and agrees with above counseling, assessment and plan. All questions answered. On 10/20/21 I have spent 30 reviewing previous notes, test results and face to face with the patient discussing the diagnosis and importance of compliance with the treatment plan as well as documenting on the day of the visit. Educational materials exercises printed for patient's review and were included in patient instructions on their After Visit Summary and given to patient at the end of visit.      USPTF:     (  ) HIV: Screening - Adolescents and Adults  Grade: A (Recommended) recommends that clinicians screen for HIV infection in ages 13 to 72 years. (  ) Hepatitis C Virus Infection: Screening--Adults at High Risk and Adults born between 1945 and 1965  Grade: B (Recommended) recommends screening for hepatitis C virus (HCV) infection in persons at high risk for infection. The USPSTF also recommends offering 1-time screening for HCV infection to adults born between 80 and 1965. (Blood pressure 118/80) High Blood Pressure: Screening and Home Monitoring -- Adults  Grade: A (Recommended) recommends screening for high blood pressure in ages 25 years or older. obtain measurements outside of the clinical setting for diagnostic confirmation before starting treatment. Annual screening for adults aged 36 years or older or those who are at increased risk for blood pressure    (  )  Lipid Disorders in Adults: Screening -- Men 28 and Older  Grade: A (Recommended) recommends screening men aged 28 and older for lipid disorders.      (Social) Alcohol Misuse: Screening and Behavioral Counseling Interventions in Primary Care -- Adults  Grade: B (Recommended) recommends that clinicians screen adults aged 25 years or older for alcohol misuse and provide persons engaged in risky or hazardous drinking with brief behavioral counseling interventions to reduce alcohol misuse. (  ) Abnormal Blood Glucose and Type 2 Diabetes Mellitus: Screening -- Adults aged 36 to 79 years who are overweight or obese Grade: B (Recommended)    (BMI 30.73)  Obesity: Screening for and Management of-- All Adults  Grade: B(Recommended) recommends screening all adults for obesity. Clinicians should offer or refer patients with a body mass index (BMI) of 30 kg/m2 or higher to intensive, multicomponent behavioral interventions. (Not a fall risk)  Fall Prevention -- Exercise/Physical Therapy: Community-dwelling Adults 72 Years or Older, Increased Risk for Falls   Grade: B (Recommended) recommends exercise or physical therapy to prevent falls in community-dwelling adults aged 72 years or older who are at increased risk for falls. (No new symptoms noted or reported today)  Depression: Screening -- General adult population, including pregnant and postpartum women  Grade: B(Recommended) recommends screening for depression in the general adult population,  Screening should be implemented with adequate systems in place to ensure accurate diagnosis, effective treatment, and appropriate follow-up. (  ) Thyroid Dysfunction: Screening --      (2020) Cervical Cancer: Screening -- Women 21 to 72 (Pap Smear) or 30-65 (in combo with HPV testing)  Grade: A(Recommended)    (  ) Breast Cancer: Screening with Mammography-- Women aged 48 to 76 years  Grade: B (Recommended) recommends biennial screening mammography for women aged 48 to 76 years. 3 months now with nausea and vomiting and abdominal cramping and is thinking she is having some anxiety and is subsequently having fog and fatigue and is having trouble getting in with counselor due to the virtual and Covid concerns she thinks that it is all stemming from this anxiety related to the initial diagnosis.   No suicidal or homicidal ideations reported    LAST VISIT  After only 1 course of prednisone she is noticing a big improvement in her triggers. No new symptomology reported. Date of surgery March 17 th, Symptoms started 24 hours after surgery. It has only progressed after that started with smell and taste. Described as moldy food. Was able to tolerate anything dairy. Biggest triggers meat, smoke, Gas Fumes and chemicals like bleach. Was told it should subside within two weeks but it has only progressed. She gets very nauseous and vomits with her biggest triggers. No major weight gain or loss reported. Med's Used in Anesthesia     sevoflurane (ULTANE) inhalation charge 1 Each  propofol (DIPRIVAN/PROPOVEN) injection 200 mg  propofol (DIPRIVAN/PROPOVEN) continuous infusion Standard Conc 10mg/mL 1,158,235 mcg  fentaNYL (SUBLIMAZE) 50 mcg/mL injection 75 mcg  remifentanil (ULTIVA) 1 mg injection 2,772.33 mcg  midazolam (VERSED) 1 mg/mL IV 2 mg  rocuronium (ZEMURON) 10 mg/ml injection 50 mg  dexamethasone (DECADRON) injection 4 mg/mL 4 mg  ondansetron (ZOFRAN) 2 mg/mL injection 8 mg  ketorolac (TORADOL) IV             15 mg  acetaminophen (OFIRMEV) IV 1,000 mg  lidocaine 1% injection                         50 mg  ceFAZolin (ANCEF) 1000 mg vial 4,000 mg  phenylephrine 10 mg/mL injection 280 mcg  calcium chloride 10% (100 mg/mL) injection 1,000 mg  morphine (PF) 1mg/ml injection 450 mcg  morphine (PF) 1mg/ml injection 4 mg  glycopyrrolate (ROBINUL)                  2 mg/mL injection    0.2 mcg  neostigmine (BLOXIVERZ)                 1 mg/mL injection    1 mg  dexmedetomidine (PRECEDEX) 4 mcg/mL injection               12 mcg    Other  Associated symptoms include abdominal pain. Pertinent negatives include no chest pain, fever or headaches. Abdominal Pain  Pertinent negatives include no fever, headaches or hematuria. Review of Systems   Constitutional: Negative for activity change and fever. HENT: Negative for nosebleeds. cerumen. No foreign body. Tympanic membrane is not injected, scarred, perforated or erythematous. Left Ear: Tympanic membrane normal. No decreased hearing noted. No drainage or tenderness. No middle ear effusion. There is no impacted cerumen. No foreign body. Tympanic membrane is not injected, scarred, perforated or erythematous. Nose: No congestion or rhinorrhea. Right Sinus: No maxillary sinus tenderness or frontal sinus tenderness. Left Sinus: No maxillary sinus tenderness or frontal sinus tenderness. Mouth/Throat:      Mouth: Mucous membranes are dry. Pharynx: Oropharynx is clear. No oropharyngeal exudate or posterior oropharyngeal erythema. Eyes:      General: Lids are normal. Vision grossly intact. No allergic shiner. Right eye: No discharge. Left eye: No discharge. Extraocular Movements: Extraocular movements intact. Right eye: No nystagmus. Left eye: No nystagmus. Conjunctiva/sclera: Conjunctivae normal.      Right eye: Right conjunctiva is not injected. No exudate or hemorrhage. Left eye: Left conjunctiva is not injected. No exudate or hemorrhage. Pupils: Pupils are equal, round, and reactive to light. Neck:      Thyroid: No thyromegaly. Meningeal: Brudzinski's sign and Kernig's sign absent. Cardiovascular:      Rate and Rhythm: Normal rate and regular rhythm. Heart sounds: Normal heart sounds. Pulmonary:      Effort: No retractions. Breath sounds: No decreased breath sounds, wheezing, rhonchi or rales. Abdominal:      Tenderness: There is no abdominal tenderness. Musculoskeletal:         General: Normal range of motion. Cervical back: Normal range of motion. No rigidity. No muscular tenderness. Lymphadenopathy:      Cervical: No cervical adenopathy. Right cervical: No superficial, deep or posterior cervical adenopathy. Left cervical: No superficial, deep or posterior cervical adenopathy. Skin:     General: Skin is warm. Neurological:      Mental Status: She is alert.               Seen By:  DANNY Anna - JOSE ANTONIO

## 2021-12-21 ENCOUNTER — OFFICE VISIT (OUTPATIENT)
Dept: FAMILY MEDICINE CLINIC | Age: 21
End: 2021-12-21
Payer: COMMERCIAL

## 2021-12-21 VITALS
HEART RATE: 97 BPM | DIASTOLIC BLOOD PRESSURE: 78 MMHG | RESPIRATION RATE: 20 BRPM | WEIGHT: 199 LBS | BODY MASS INDEX: 31.23 KG/M2 | HEIGHT: 67 IN | OXYGEN SATURATION: 98 % | TEMPERATURE: 97.7 F | SYSTOLIC BLOOD PRESSURE: 130 MMHG

## 2021-12-21 DIAGNOSIS — F33.0 MILD EPISODE OF RECURRENT MAJOR DEPRESSIVE DISORDER (HCC): Primary | ICD-10-CM

## 2021-12-21 DIAGNOSIS — G97.82 OTHER POSTOPERATIVE COMPLICATION INVOLVING NERVOUS SYSTEM: ICD-10-CM

## 2021-12-21 DIAGNOSIS — F41.9 ANXIETY: ICD-10-CM

## 2021-12-21 DIAGNOSIS — R43.2 ALTERED TASTE: ICD-10-CM

## 2021-12-21 PROCEDURE — 99213 OFFICE O/P EST LOW 20 MIN: CPT | Performed by: NURSE PRACTITIONER

## 2021-12-21 RX ORDER — ESCITALOPRAM OXALATE 10 MG/1
10 TABLET ORAL DAILY
Qty: 30 TABLET | Refills: 5 | Status: SHIPPED
Start: 2021-12-21 | End: 2022-04-27 | Stop reason: SINTOL

## 2021-12-21 RX ORDER — ONDANSETRON 4 MG/1
4 TABLET, FILM COATED ORAL DAILY PRN
Qty: 30 TABLET | Refills: 3 | Status: SHIPPED | OUTPATIENT
Start: 2021-12-21

## 2021-12-21 SDOH — ECONOMIC STABILITY: FOOD INSECURITY: WITHIN THE PAST 12 MONTHS, YOU WORRIED THAT YOUR FOOD WOULD RUN OUT BEFORE YOU GOT MONEY TO BUY MORE.: NEVER TRUE

## 2021-12-21 SDOH — ECONOMIC STABILITY: FOOD INSECURITY: WITHIN THE PAST 12 MONTHS, THE FOOD YOU BOUGHT JUST DIDN'T LAST AND YOU DIDN'T HAVE MONEY TO GET MORE.: NEVER TRUE

## 2021-12-21 ASSESSMENT — ENCOUNTER SYMPTOMS
RECTAL PAIN: 0
ABDOMINAL PAIN: 0
ANAL BLEEDING: 0
BLOOD IN STOOL: 0
COLOR CHANGE: 0

## 2021-12-21 ASSESSMENT — SOCIAL DETERMINANTS OF HEALTH (SDOH): HOW HARD IS IT FOR YOU TO PAY FOR THE VERY BASICS LIKE FOOD, HOUSING, MEDICAL CARE, AND HEATING?: NOT HARD AT ALL

## 2021-12-21 ASSESSMENT — PATIENT HEALTH QUESTIONNAIRE - PHQ9
2. FEELING DOWN, DEPRESSED OR HOPELESS: 0
SUM OF ALL RESPONSES TO PHQ QUESTIONS 1-9: 0
SUM OF ALL RESPONSES TO PHQ QUESTIONS 1-9: 0
1. LITTLE INTEREST OR PLEASURE IN DOING THINGS: 0
SUM OF ALL RESPONSES TO PHQ QUESTIONS 1-9: 0
SUM OF ALL RESPONSES TO PHQ9 QUESTIONS 1 & 2: 0

## 2021-12-21 ASSESSMENT — VISUAL ACUITY: OU: 1

## 2021-12-21 NOTE — PROGRESS NOTES
Donald Hansen : 2000 Sex: female  Age: 24 y.o. Chief Complaint   Patient presents with    Medication Check     for Anxiety Lexapro working well patient states     Medication Refill       Assessment and Plan:  There are no diagnoses linked to this encounter. No follow-ups on file. Educational materials printed for patient's review and were included in patient instructions on her After Visit Summary and given to patient at the end of visit. Counseled regarding above diagnosis, including possible risks and complications,  especially if left uncontrolled. Counseled regarding the possible side effects, risks, benefits and alternatives to treatment; patient and/or guardian verbalizes understanding, agrees, feels comfortable with and wishes to proceed with above treatment plan. Advised patient to call with any new medication issues, and read all Rx info from pharmacy to assure aware of all possible risks and side effects of medication before taking. Reviewed age and gender appropriate health screening exams and vaccinations. Advised patient regarding importance of keeping up with recommended health maintenance and to schedule as soon as possible if overdue, as this is important in assessing for undiagnosed pathology, especially cancer, as well as protecting against potentially harmful/life threatening disease. Patient verbalizes understanding and agrees with above counseling, assessment and plan. All questions answered. On 21 I have spent 30 reviewing previous notes, test results and face to face with the patient discussing the diagnosis and importance of compliance with the treatment plan as well as documenting on the day of the visit.   Educational materials exercises printed for patient's review and were included in patient instructions on their After Visit Summary and given to patient at the end of visit.      USPTF:     (  ) HIV: Screening - Adolescents and Adults  Grade: A (Recommended) recommends that clinicians screen for HIV infection in ages 13 to 72 years. (  ) Hepatitis C Virus Infection: Screening--Adults at High Risk and Adults born between 1945 and 1965  Grade: B (Recommended) recommends screening for hepatitis C virus (HCV) infection in persons at high risk for infection. The USPSTF also recommends offering 1-time screening for HCV infection to adults born between 80 and 1965. (Blood pressure 130/78) High Blood Pressure: Screening and Home Monitoring -- Adults  Grade: A (Recommended) recommends screening for high blood pressure in ages 25 years or older. obtain measurements outside of the clinical setting for diagnostic confirmation before starting treatment. Annual screening for adults aged 36 years or older or those who are at increased risk for blood pressure    (  )  Lipid Disorders in Adults: Screening -- Men 28 and Older  Grade: A (Recommended) recommends screening men aged 28 and older for lipid disorders. (Social) Alcohol Misuse: Screening and Behavioral Counseling Interventions in Primary Care -- Adults  Grade: B (Recommended) recommends that clinicians screen adults aged 25 years or older for alcohol misuse and provide persons engaged in risky or hazardous drinking with brief behavioral counseling interventions to reduce alcohol misuse. (  ) Abnormal Blood Glucose and Type 2 Diabetes Mellitus: Screening -- Adults aged 36 to 79 years who are overweight or obese Grade: B (Recommended)    (BMI 30.94)  Obesity: Screening for and Management of-- All Adults  Grade: B(Recommended) recommends screening all adults for obesity. Clinicians should offer or refer patients with a body mass index (BMI) of 30 kg/m2 or higher to intensive, multicomponent behavioral interventions.          (Not a fall risk)  Fall Prevention -- Exercise/Physical Therapy: Community-dwelling Adults 72 Years or Older, Increased Risk for Falls   Grade: B (Recommended) recommends exercise or physical therapy to prevent falls in community-dwelling adults aged 72 years or older who are at increased risk for falls. (No new symptoms noted or reported today)  Depression: Screening -- General adult population, including pregnant and postpartum women  Grade: B(Recommended) recommends screening for depression in the general adult population,  Screening should be implemented with adequate systems in place to ensure accurate diagnosis, effective treatment, and appropriate follow-up. (  ) Thyroid Dysfunction: Screening --      (2020) Cervical Cancer: Screening -- Women 21 to 72 (Pap Smear) or 30-65 (in combo with HPV testing)  Grade: A(Recommended)    (  ) Breast Cancer: Screening with Mammography-- Women aged 48 to 76 years  Grade: B (Recommended) recommends biennial screening mammography for women aged 48 to 76 years. Patient is doing much better now on medications she still is not able to taste much but feels like and likes the way she feels on the Lexapro. She understands that this may not be something that she needs to stay on but we talked about weaning off the medication when she felt like it was time. LAST VISIT  3 months now with nausea and vomiting and abdominal cramping and is thinking she is having some anxiety and is subsequently having fog and fatigue and is having trouble getting in with counselor due to the virtual and Covid concerns she thinks that it is all stemming from this anxiety related to the initial diagnosis. No suicidal or homicidal ideations reported      After only 1 course of prednisone she is noticing a big improvement in her triggers. No new symptomology reported. Date of surgery March 17 th, Symptoms started 24 hours after surgery. It has only progressed after that started with smell and taste. Described as moldy food. Was able to tolerate anything dairy. Biggest triggers meat, smoke, Gas Fumes and chemicals like bleach.   Was told it should subside within two weeks but it has only progressed. She gets very nauseous and vomits with her biggest triggers. No major weight gain or loss reported. Med's Used in Anesthesia     sevoflurane (ULTANE) inhalation charge 1 Each  propofol (DIPRIVAN/PROPOVEN) injection 200 mg  propofol (DIPRIVAN/PROPOVEN) continuous infusion Standard Conc 10mg/mL 1,158,235 mcg  fentaNYL (SUBLIMAZE) 50 mcg/mL injection 75 mcg  remifentanil (ULTIVA) 1 mg injection 2,772.33 mcg  midazolam (VERSED) 1 mg/mL IV 2 mg  rocuronium (ZEMURON) 10 mg/ml injection 50 mg  dexamethasone (DECADRON) injection 4 mg/mL 4 mg  ondansetron (ZOFRAN) 2 mg/mL injection 8 mg  ketorolac (TORADOL) IV             15 mg  acetaminophen (OFIRMEV) IV 1,000 mg  lidocaine 1% injection                         50 mg  ceFAZolin (ANCEF) 1000 mg vial 4,000 mg  phenylephrine 10 mg/mL injection 280 mcg  calcium chloride 10% (100 mg/mL) injection 1,000 mg  morphine (PF) 1mg/ml injection 450 mcg  morphine (PF) 1mg/ml injection 4 mg  glycopyrrolate (ROBINUL)                  2 mg/mL injection    0.2 mcg  neostigmine (BLOXIVERZ)                 1 mg/mL injection    1 mg  dexmedetomidine (PRECEDEX) 4 mcg/mL injection               12 mcg    Abdominal Pain  Pertinent negatives include no fever, headaches or hematuria. Other  Pertinent negatives include no abdominal pain, chest pain, fever or headaches. Review of Systems   Constitutional: Negative for activity change and fever. HENT: Negative for nosebleeds. Cardiovascular: Negative for chest pain, palpitations and leg swelling. Gastrointestinal: Negative for abdominal pain, anal bleeding, blood in stool and rectal pain. Genitourinary: Negative for hematuria. Skin: Negative for color change and pallor. Neurological: Negative for light-headedness and headaches. Hematological: Does not bruise/bleed easily.          Current Outpatient Medications:     ondansetron (ZOFRAN) 4 MG tablet, Take 1 tablet by mouth daily as needed for Nausea or Vomiting, Disp: 30 tablet, Rfl: 3    escitalopram (LEXAPRO) 10 MG tablet, Take 1 tablet by mouth daily, Disp: 30 tablet, Rfl: 5    fluticasone (FLONASE) 50 MCG/ACT nasal spray, 2 sprays by Nasal route daily Use in both nostrils. , Disp: 1 Bottle, Rfl: 3    acetaminophen (TYLENOL) 500 MG tablet, Take 500 mg by mouth every 6 hours as needed for Pain, Disp: , Rfl:     Misc Natural Products (AIRBORNE ELDERBERRY) CHEW, Take by mouth daily , Disp: , Rfl:     Ascorbic Acid (VITAMIN C) 500 MG CHEW, Take by mouth daily, Disp: , Rfl:     Norgestim-Eth Estrad Triphasic 0.18/0.215/0.25 MG-35 MCG TABS, take 1 tablet by mouth once daily, Disp: , Rfl:     omeprazole (PRILOSEC) 40 MG delayed release capsule, Take 1 capsule by mouth daily (Patient not taking: Reported on 10/20/2021), Disp: 90 capsule, Rfl: 0    clindamycin (CLEOCIN) 300 MG capsule, take 1 capsule by mouth three times a day (Patient not taking: Reported on 10/20/2021), Disp: , Rfl:   Allergies   Allergen Reactions    Morphine Nausea And Vomiting       History reviewed. No pertinent past medical history.   Past Surgical History:   Procedure Laterality Date    RHINOPLASTY  1    SPINAL FUSION  03/17/2021     Family History   Problem Relation Age of Onset    Rheum Arthritis Mother     Rheum Arthritis Father     Rashes/Skin Problems Brother      Social History     Socioeconomic History    Marital status: Single     Spouse name: Not on file    Number of children: Not on file    Years of education: Not on file    Highest education level: Not on file   Occupational History    Not on file   Tobacco Use    Smoking status: Never Smoker    Smokeless tobacco: Never Used   Vaping Use    Vaping Use: Never used   Substance and Sexual Activity    Alcohol use: Never    Drug use: Never    Sexual activity: Not on file   Other Topics Concern    Not on file   Social History Narrative    ** Merged History Encounter ** Social Determinants of Health     Financial Resource Strain: Low Risk     Difficulty of Paying Living Expenses: Not hard at all   Food Insecurity: No Food Insecurity    Worried About Running Out of Food in the Last Year: Never true    Dru of Food in the Last Year: Never true   Transportation Needs:     Lack of Transportation (Medical): Not on file    Lack of Transportation (Non-Medical): Not on file   Physical Activity:     Days of Exercise per Week: Not on file    Minutes of Exercise per Session: Not on file   Stress:     Feeling of Stress : Not on file   Social Connections:     Frequency of Communication with Friends and Family: Not on file    Frequency of Social Gatherings with Friends and Family: Not on file    Attends [employer] Services: Not on file    Active Member of [address] [place] [address] [employer] or Organizations: Not on file    Attends Club or Organization Meetings: Not on file    Marital Status: Not on file   Intimate Partner Violence:     Fear of Current or Ex-Partner: Not on file    Emotionally Abused: Not on file    Physically Abused: Not on file    Sexually Abused: Not on file   Housing Stability:     Unable to Pay for Housing in the Last Year: Not on file    Number of [place] in the Last Year: Not on file    Unstable Housing in the Last Year: Not on file       Vitals:    12/21/21 1114   BP: 130/78   Pulse: 97   Resp: 20   Temp: 97.7 °F (36.5 °C)   TempSrc: Temporal   SpO2: 98%   Weight: 199 lb (90.3 kg)   Height: 5' 7.25\" (1.708 m)       Physical Exam  Constitutional:       Appearance: Normal appearance. She is normal weight. HENT:      Head: Normocephalic and atraumatic. Right Ear: Tympanic membrane and ear canal normal. No decreased hearing noted. No drainage or tenderness. No middle ear effusion. There is no impacted cerumen. No foreign body. Tympanic membrane is not injected, scarred, perforated or erythematous.       Left Ear: Tympanic membrane normal. No decreased hearing noted. No drainage or tenderness. No middle ear effusion. There is no impacted cerumen. No foreign body. Tympanic membrane is not injected, scarred, perforated or erythematous. Nose: No congestion or rhinorrhea. Right Sinus: No maxillary sinus tenderness or frontal sinus tenderness. Left Sinus: No maxillary sinus tenderness or frontal sinus tenderness. Mouth/Throat:      Mouth: Mucous membranes are dry. Pharynx: Oropharynx is clear. No oropharyngeal exudate or posterior oropharyngeal erythema. Eyes:      General: Lids are normal. Vision grossly intact. No allergic shiner. Right eye: No discharge. Left eye: No discharge. Extraocular Movements: Extraocular movements intact. Right eye: No nystagmus. Left eye: No nystagmus. Conjunctiva/sclera: Conjunctivae normal.      Right eye: Right conjunctiva is not injected. No exudate or hemorrhage. Left eye: Left conjunctiva is not injected. No exudate or hemorrhage. Pupils: Pupils are equal, round, and reactive to light. Neck:      Thyroid: No thyromegaly. Meningeal: Brudzinski's sign and Kernig's sign absent. Cardiovascular:      Rate and Rhythm: Normal rate and regular rhythm. Heart sounds: Normal heart sounds. Pulmonary:      Effort: No retractions. Breath sounds: No decreased breath sounds, wheezing, rhonchi or rales. Abdominal:      Tenderness: There is no abdominal tenderness. Musculoskeletal:         General: Normal range of motion. Cervical back: Normal range of motion. No rigidity. No muscular tenderness. Lymphadenopathy:      Cervical: No cervical adenopathy. Right cervical: No superficial, deep or posterior cervical adenopathy. Left cervical: No superficial, deep or posterior cervical adenopathy. Skin:     General: Skin is warm. Neurological:      Mental Status: She is alert.               Seen By:  DANNY Page - CNP

## 2022-04-27 ENCOUNTER — OFFICE VISIT (OUTPATIENT)
Dept: FAMILY MEDICINE CLINIC | Age: 22
End: 2022-04-27
Payer: COMMERCIAL

## 2022-04-27 VITALS
OXYGEN SATURATION: 99 % | TEMPERATURE: 97.6 F | SYSTOLIC BLOOD PRESSURE: 124 MMHG | BODY MASS INDEX: 32.21 KG/M2 | RESPIRATION RATE: 17 BRPM | HEIGHT: 67 IN | DIASTOLIC BLOOD PRESSURE: 82 MMHG | HEART RATE: 78 BPM | WEIGHT: 205.2 LBS

## 2022-04-27 DIAGNOSIS — R41.840 ATTENTION DEFICIT: ICD-10-CM

## 2022-04-27 DIAGNOSIS — F33.0 MILD EPISODE OF RECURRENT MAJOR DEPRESSIVE DISORDER (HCC): ICD-10-CM

## 2022-04-27 DIAGNOSIS — F41.9 ANXIETY: Primary | ICD-10-CM

## 2022-04-27 PROCEDURE — 99213 OFFICE O/P EST LOW 20 MIN: CPT | Performed by: NURSE PRACTITIONER

## 2022-04-27 RX ORDER — METOPROLOL SUCCINATE 25 MG/1
12.5 TABLET, EXTENDED RELEASE ORAL DAILY
Qty: 45 TABLET | Refills: 1 | Status: SHIPPED
Start: 2022-04-27 | End: 2022-06-23

## 2022-04-27 ASSESSMENT — ENCOUNTER SYMPTOMS
BLOOD IN STOOL: 0
ANAL BLEEDING: 0
ABDOMINAL PAIN: 0
RECTAL PAIN: 0
COLOR CHANGE: 0

## 2022-04-27 ASSESSMENT — VISUAL ACUITY: OU: 1

## 2022-04-27 NOTE — PATIENT INSTRUCTIONS
Patient Education        Learning About Anxiety Disorders  What are anxiety disorders? Anxiety disorders are a type of medical problem. They cause severe anxiety. When you feel anxious, you feel that something bad is about to happen. Thisfeeling interferes with your life. These disorders include:   Generalized anxiety disorder. You feel worried and stressed about many everyday events and activities. This goes on for several months and disrupts your life on most days.  Panic disorder. You have repeated panic attacks. A panic attack is a sudden, intense fear or anxiety. It may make you feel short of breath. Your heart may pound.  Social anxiety disorder. You feel very anxious about what you will say or do in front of people. For example, you may be scared to talk or eat in public. This problem affects your daily life.  Phobias. You are very scared of a specific object, situation, or activity. For example, you may fear spiders, high places, or small spaces. What are the symptoms? Generalized anxiety disorder  Symptoms may include:   Feeling worried and stressed about many things almost every day.  Feeling tired or irritable. You may have a hard time concentrating.  Having headaches or muscle aches.  Having a hard time getting to sleep or staying asleep. Panic disorder  You may have repeated panic attacks when there is no reason for feeling afraid. You may change your daily activities because you worry that you will haveanother attack. Symptoms may include:   Intense fear, terror, or anxiety.  Trouble breathing or very fast breathing.  Chest pain or tightness.  A heartbeat that races or is not regular. Social anxiety disorder  Symptoms may include:   Fear about a social situation, such as eating in front of others or speaking in public. You may worry a lot. Or you may be afraid that something bad will happen.  Anxiety that can cause you to blush, sweat, and feel shaky.    A heartbeat that is faster than normal.   A hard time focusing. Phobias  Symptoms may include:   More fear than most people of being around an object, being in a situation, or doing an activity. You might also be stressed about the chance of being around the thing you fear.  Worry about losing control, panicking, fainting, or having physical symptoms like a faster heartbeat when you are around the situation or object. How are these disorders treated? Anxiety disorders can be treated with medicines or counseling. A combination ofboth may be used. Medicines may include:   Antidepressants. These may help your symptoms by keeping chemicals in your brain in balance.  Benzodiazepines. These may give you short-term relief of your symptoms. Some people use cognitive-behavioral therapy. A therapist helps you learn tochange stressful or bad thoughts into helpful thoughts. Lead a healthy lifestyle  A healthy lifestyle may help you feel better.  Get at least 30 minutes of exercise on most days of the week. Walking is a good choice.  Eat a healthy diet. Include fruits, vegetables, lean proteins, and whole grains in your diet each day.  Try to go to bed at the same time every night. Try for 8 hours of sleep a night.  Find ways to manage stress. Try relaxation exercises.  Avoid alcohol and illegal drugs. Follow-up care is a key part of your treatment and safety. Be sure to make and go to all appointments, and call your doctor if you are having problems. It's also a good idea to know your test results and keep alist of the medicines you take. Where can you learn more? Go to https://stacy.[employer]. org and sign in to your [employer] account. Enter C661 in the [facility] box to learn more about \"Learning About Anxiety Disorders. \"     If you do not have an account, please click on the \"Sign Up Now\" link.   Current as of: June 16, 2021               Content Version: 13.2  © 2431-6626 [employer]. Care instructions adapted under license by [facility] ([facility]). If you have questions about a medical condition or this instruction, always ask your healthcare professional. [address] any warranty or liability for your use of this information.

## 2022-04-27 NOTE — PROGRESS NOTES
Geryl Duverney : 2000 Sex: female  Age: 25 y.o. Chief Complaint   Patient presents with    Medication Check     patient requesting different anxiety medication       Assessment and Plan:  Isadora was seen today for medication check. Diagnoses and all orders for this visit:    Anxiety    Mild episode of recurrent major depressive disorder ([facility] [address]    Attention deficit  Comments: We will try her out on a beta-blocker for right now but probably needs an EKG and see if we can start her on a stimulant for anxiety    Other orders  -     metoprolol succinate (TOPROL XL) 25 MG extended release tablet; Take 0.5 tablets by mouth daily        Return in about 4 weeks (around 2022). Educational materials printed for patient's review and were included in patient instructions on her After Visit Summary and given to patient at the end of visit. Counseled regarding above diagnosis, including possible risks and complications,  especially if left uncontrolled. Counseled regarding the possible side effects, risks, benefits and alternatives to treatment; patient and/or guardian verbalizes understanding, agrees, feels comfortable with and wishes to proceed with above treatment plan. Advised patient to call with any new medication issues, and read all Rx info from pharmacy to assure aware of all possible risks and side effects of medication before taking. Reviewed age and gender appropriate health screening exams and vaccinations. Advised patient regarding importance of keeping up with recommended health maintenance and to schedule as soon as possible if overdue, as this is important in assessing for undiagnosed pathology, especially cancer, as well as protecting against potentially harmful/life threatening disease. Patient verbalizes understanding and agrees with above counseling, assessment and plan. All questions answered.     On 22 I have spent 30 reviewing previous notes, test results and face to face with the patient discussing the diagnosis and importance of compliance with the treatment plan as well as documenting on the day of the visit. Educational materials exercises printed for patient's review and were included in patient instructions on their After Visit Summary and given to patient at the end of visit.      USPTF:      (Blood pressure 130/78) High Blood Pressure: Screening and Home Monitoring -- Adults  Grade: A (Recommended) recommends screening for high blood pressure in ages 25 years or older. obtain measurements outside of the clinical setting for diagnostic confirmation before starting treatment. Annual screening for adults aged 36 years or older or those who are at increased risk for blood pressure    (  )  Lipid Disorders in Adults: Screening -- Men 28 and Older  Grade: A (Recommended) recommends screening men aged 28 and older for lipid disorders. (Social) Alcohol Misuse: Screening and Behavioral Counseling Interventions in Primary Care -- Adults  Grade: B (Recommended) recommends that clinicians screen adults aged 25 years or older for alcohol misuse and provide persons engaged in risky or hazardous drinking with brief behavioral counseling interventions to reduce alcohol misuse. (  ) Abnormal Blood Glucose and Type 2 Diabetes Mellitus: Screening -- Adults aged 36 to 79 years who are overweight or obese Grade: B (Recommended)    (BMI 30.94)  Obesity: Screening for and Management of-- All Adults  Grade: B(Recommended) recommends screening all adults for obesity. Clinicians should offer or refer patients with a body mass index (BMI) of 30 kg/m2 or higher to intensive, multicomponent behavioral interventions.          (Not a fall risk)  Fall Prevention -- Exercise/Physical Therapy: Community-dwelling Adults 72 Years or Older, Increased Risk for Falls   Grade: B (Recommended) recommends exercise or physical therapy to prevent falls in community-dwelling adults aged 72 years or older who are at increased risk for falls. (No new symptoms noted or reported today)  Depression: Screening -- General adult population, including pregnant and postpartum women  Grade: B(Recommended) recommends screening for depression in the general adult population,  Screening should be implemented with adequate systems in place to ensure accurate diagnosis, effective treatment, and appropriate follow-up. (  ) Thyroid Dysfunction: Screening --      (2020) Cervical Cancer: Screening -- Women 21 to 72 (Pap Smear) or 30-65 (in combo with HPV testing)  Grade: A(Recommended)    (  ) Breast Cancer: Screening with Mammography-- Women aged 48 to 76 years  Grade: B (Recommended) recommends biennial screening mammography for women aged 48 to 76 years. Stopped the med 2 weeks by breaking it in half for one week and then stopped it now is feeling not so well is getting more anxiety and shaking and is really trying to avoid any medications that causes weight gain. LAST VISIT  Patient is doing much better now on medications she still is not able to taste much but feels like and likes the way she feels on the Lexapro. She understands that this may not be something that she needs to stay on but we talked about weaning off the medication when she felt like it was time. PRIOR VISIT  3 months now with nausea and vomiting and abdominal cramping and is thinking she is having some anxiety and is subsequently having fog and fatigue and is having trouble getting in with counselor due to the virtual and Covid concerns she thinks that it is all stemming from this anxiety related to the initial diagnosis. No suicidal or homicidal ideations reported    PRIOR VISIT  After only 1 course of prednisone she is noticing a big improvement in her triggers. No new symptomology reported. Date of surgery March 17 th, Symptoms started 24 hours after surgery.   It has only progressed after that started with smell and taste. Described as moldy food. Was able to tolerate anything dairy. Biggest triggers meat, smoke, Gas Fumes and chemicals like bleach. Was told it should subside within two weeks but it has only progressed. She gets very nauseous and vomits with her biggest triggers. No major weight gain or loss reported. Med's Used in Anesthesia     sevoflurane (ULTANE) inhalation charge 1 Each  propofol (DIPRIVAN/PROPOVEN) injection 200 mg  propofol (DIPRIVAN/PROPOVEN) continuous infusion Standard Conc 10mg/mL 1,158,235 mcg  fentaNYL (SUBLIMAZE) 50 mcg/mL injection 75 mcg  remifentanil (ULTIVA) 1 mg injection 2,772.33 mcg  midazolam (VERSED) 1 mg/mL IV 2 mg  rocuronium (ZEMURON) 10 mg/ml injection 50 mg  dexamethasone (DECADRON) injection 4 mg/mL 4 mg  ondansetron (ZOFRAN) 2 mg/mL injection 8 mg  ketorolac (TORADOL) IV             15 mg  acetaminophen (OFIRMEV) IV 1,000 mg  lidocaine 1% injection                         50 mg  ceFAZolin (ANCEF) 1000 mg vial 4,000 mg  phenylephrine 10 mg/mL injection 280 mcg  calcium chloride 10% (100 mg/mL) injection 1,000 mg  morphine (PF) 1mg/ml injection 450 mcg  morphine (PF) 1mg/ml injection 4 mg  glycopyrrolate (ROBINUL)                  2 mg/mL injection    0.2 mcg  neostigmine (BLOXIVERZ)                 1 mg/mL injection    1 mg  dexmedetomidine (PRECEDEX) 4 mcg/mL injection               12 mcg    Abdominal Pain  Pertinent negatives include no fever, headaches or hematuria. Other  Pertinent negatives include no abdominal pain, chest pain, fever or headaches. Review of Systems   Constitutional: Negative for activity change and fever. HENT: Negative for nosebleeds. Cardiovascular: Negative for chest pain, palpitations and leg swelling. Gastrointestinal: Negative for abdominal pain, anal bleeding, blood in stool and rectal pain. Genitourinary: Negative for hematuria. Skin: Negative for color change and pallor.    Neurological: Negative for light-headedness and headaches. Hematological: Does not bruise/bleed easily. Current Outpatient Medications:     metoprolol succinate (TOPROL XL) 25 MG extended release tablet, Take 0.5 tablets by mouth daily, Disp: 45 tablet, Rfl: 1    ondansetron (ZOFRAN) 4 MG tablet, Take 1 tablet by mouth daily as needed for Nausea or Vomiting, Disp: 30 tablet, Rfl: 3    acetaminophen (TYLENOL) 500 MG tablet, Take 500 mg by mouth every 6 hours as needed for Pain, Disp: , Rfl:     Misc Natural Products (AIRBORNE ELDERBERRY) CHEW, Take by mouth daily , Disp: , Rfl:     Ascorbic Acid (VITAMIN C) 500 MG CHEW, Take by mouth daily, Disp: , Rfl:   Allergies   Allergen Reactions    Morphine Nausea And Vomiting       History reviewed. No pertinent past medical history.   Past Surgical History:   Procedure Laterality Date    RHINOPLASTY  1    SPINAL FUSION  03/17/2021     Family History   Problem Relation Age of Onset    Rheum Arthritis Mother     Rheum Arthritis Father     Rashes/Skin Problems Brother      Social History     Socioeconomic History    Marital status: Single     Spouse name: Not on file    Number of children: Not on file    Years of education: Not on file    Highest education level: Not on file   Occupational History    Not on file   Tobacco Use    Smoking status: Never Smoker    Smokeless tobacco: Never Used   Vaping Use    Vaping Use: Never used   Substance and Sexual Activity    Alcohol use: Never    Drug use: Never    Sexual activity: Not on file   Other Topics Concern    Not on file   Social History Narrative    ** Merged History Encounter **          Social Determinants of Health     Financial Resource Strain: Low Risk     Difficulty of Paying Living Expenses: Not hard at all   Food Insecurity: No Food Insecurity    Worried About Running Out of Food in the Last Year: Never true    Dru of Food in the Last Year: Never true   Transportation Needs:     Lack of Transportation (Medical): Not on file    Lack of Transportation (Non-Medical): Not on file   Physical Activity:     Days of Exercise per Week: Not on file    Minutes of Exercise per Session: Not on file   Stress:     Feeling of Stress : Not on file   Social Connections:     Frequency of Communication with Friends and Family: Not on file    Frequency of Social Gatherings with Friends and Family: Not on file    Attends Hinduism Services: Not on file    Active Member of Clubs or Organizations: Not on file    Attends Club or Organization Meetings: Not on file    Marital Status: Not on file   Intimate Partner Violence:     Fear of Current or Ex-Partner: Not on file    Emotionally Abused: Not on file    Physically Abused: Not on file    Sexually Abused: Not on file   Housing Stability:     Unable to Pay for Housing in the Last Year: Not on file    Number of [place] in the Last Year: Not on file    Unstable Housing in the Last Year: Not on file       Vitals:    04/27/22 1253   BP: 124/82   Site: Right Upper Arm   Position: Sitting   Cuff Size: Large Adult   Pulse: 78   Resp: 17   Temp: 97.6 °F (36.4 °C)   TempSrc: Temporal   SpO2: 99%   Weight: 205 lb 3.2 oz (93.1 kg)   Height: 5' 7.25\" (1.708 m)       Physical Exam  Constitutional:       Appearance: Normal appearance. She is normal weight. HENT:      Head: Normocephalic and atraumatic. Right Ear: Tympanic membrane and ear canal normal. No decreased hearing noted. No drainage or tenderness. No middle ear effusion. There is no impacted cerumen. No foreign body. Tympanic membrane is not injected, scarred, perforated or erythematous. Left Ear: Tympanic membrane normal. No decreased hearing noted. No drainage or tenderness. No middle ear effusion. There is no impacted cerumen. No foreign body. Tympanic membrane is not injected, scarred, perforated or erythematous. Nose: No congestion or rhinorrhea.       Right Sinus: No maxillary sinus tenderness or frontal sinus tenderness. Left Sinus: No maxillary sinus tenderness or frontal sinus tenderness. Mouth/Throat:      Mouth: Mucous membranes are dry. Pharynx: Oropharynx is clear. No oropharyngeal exudate or posterior oropharyngeal erythema. Eyes:      General: Lids are normal. Vision grossly intact. No allergic shiner. Right eye: No discharge. Left eye: No discharge. Extraocular Movements: Extraocular movements intact. Right eye: No nystagmus. Left eye: No nystagmus. Conjunctiva/sclera: Conjunctivae normal.      Right eye: Right conjunctiva is not injected. No exudate or hemorrhage. Left eye: Left conjunctiva is not injected. No exudate or hemorrhage. Pupils: Pupils are equal, round, and reactive to light. Neck:      Thyroid: No thyromegaly. Meningeal: Brudzinski's sign and Kernig's sign absent. Cardiovascular:      Rate and Rhythm: Normal rate and regular rhythm. Heart sounds: Normal heart sounds. Pulmonary:      Effort: No retractions. Breath sounds: No decreased breath sounds, wheezing, rhonchi or rales. Abdominal:      Tenderness: There is no abdominal tenderness. Musculoskeletal:         General: Normal range of motion. Cervical back: Normal range of motion. No rigidity. No muscular tenderness. Lymphadenopathy:      Cervical: No cervical adenopathy. Right cervical: No superficial, deep or posterior cervical adenopathy. Left cervical: No superficial, deep or posterior cervical adenopathy. Skin:     General: Skin is warm. Neurological:      Mental Status: She is alert.               Seen By:  DANNY Castro - CNP

## 2022-05-26 ENCOUNTER — OFFICE VISIT (OUTPATIENT)
Dept: FAMILY MEDICINE CLINIC | Age: 22
End: 2022-05-26
Payer: COMMERCIAL

## 2022-05-26 VITALS
HEART RATE: 84 BPM | WEIGHT: 201 LBS | TEMPERATURE: 97.5 F | OXYGEN SATURATION: 98 % | HEIGHT: 67 IN | DIASTOLIC BLOOD PRESSURE: 70 MMHG | RESPIRATION RATE: 18 BRPM | SYSTOLIC BLOOD PRESSURE: 120 MMHG | BODY MASS INDEX: 31.55 KG/M2

## 2022-05-26 DIAGNOSIS — Z00.00 GENERAL MEDICAL EXAMINATION: ICD-10-CM

## 2022-05-26 DIAGNOSIS — Z13.29 SCREENING FOR THYROID DISORDER: ICD-10-CM

## 2022-05-26 DIAGNOSIS — F98.8 ATTENTION DEFICIT DISORDER (ADD) WITHOUT HYPERACTIVITY: ICD-10-CM

## 2022-05-26 DIAGNOSIS — Z00.00 GENERAL MEDICAL EXAMINATION: Primary | ICD-10-CM

## 2022-05-26 DIAGNOSIS — Z13.220 SCREENING FOR LIPOID DISORDERS: ICD-10-CM

## 2022-05-26 LAB
BASOPHILS ABSOLUTE: 0.05 E9/L (ref 0–0.2)
BASOPHILS RELATIVE PERCENT: 0.9 % (ref 0–2)
EOSINOPHILS ABSOLUTE: 0.08 E9/L (ref 0.05–0.5)
EOSINOPHILS RELATIVE PERCENT: 1.4 % (ref 0–6)
HCT VFR BLD CALC: 39.5 % (ref 34–48)
HEMOGLOBIN: 12.9 G/DL (ref 11.5–15.5)
IMMATURE GRANULOCYTES #: 0.01 E9/L
IMMATURE GRANULOCYTES %: 0.2 % (ref 0–5)
LYMPHOCYTES ABSOLUTE: 2.12 E9/L (ref 1.5–4)
LYMPHOCYTES RELATIVE PERCENT: 37.4 % (ref 20–42)
MCH RBC QN AUTO: 28.5 PG (ref 26–35)
MCHC RBC AUTO-ENTMCNC: 32.7 % (ref 32–34.5)
MCV RBC AUTO: 87.4 FL (ref 80–99.9)
MONOCYTES ABSOLUTE: 0.37 E9/L (ref 0.1–0.95)
MONOCYTES RELATIVE PERCENT: 6.5 % (ref 2–12)
NEUTROPHILS ABSOLUTE: 3.04 E9/L (ref 1.8–7.3)
NEUTROPHILS RELATIVE PERCENT: 53.6 % (ref 43–80)
PDW BLD-RTO: 12.9 FL (ref 11.5–15)
PLATELET # BLD: 209 E9/L (ref 130–450)
PMV BLD AUTO: 12.3 FL (ref 7–12)
RBC # BLD: 4.52 E12/L (ref 3.5–5.5)
WBC # BLD: 5.7 E9/L (ref 4.5–11.5)

## 2022-05-26 PROCEDURE — 93000 ELECTROCARDIOGRAM COMPLETE: CPT | Performed by: NURSE PRACTITIONER

## 2022-05-26 PROCEDURE — 99213 OFFICE O/P EST LOW 20 MIN: CPT | Performed by: NURSE PRACTITIONER

## 2022-05-26 RX ORDER — DEXTROAMPHETAMINE SACCHARATE, AMPHETAMINE ASPARTATE MONOHYDRATE, DEXTROAMPHETAMINE SULFATE AND AMPHETAMINE SULFATE 2.5; 2.5; 2.5; 2.5 MG/1; MG/1; MG/1; MG/1
10 CAPSULE, EXTENDED RELEASE ORAL EVERY MORNING
Qty: 30 CAPSULE | Refills: 0 | Status: SHIPPED
Start: 2022-05-26 | End: 2022-06-23

## 2022-05-26 SDOH — ECONOMIC STABILITY: FOOD INSECURITY: WITHIN THE PAST 12 MONTHS, YOU WORRIED THAT YOUR FOOD WOULD RUN OUT BEFORE YOU GOT MONEY TO BUY MORE.: NEVER TRUE

## 2022-05-26 SDOH — ECONOMIC STABILITY: FOOD INSECURITY: WITHIN THE PAST 12 MONTHS, THE FOOD YOU BOUGHT JUST DIDN'T LAST AND YOU DIDN'T HAVE MONEY TO GET MORE.: NEVER TRUE

## 2022-05-26 ASSESSMENT — ENCOUNTER SYMPTOMS
BLOOD IN STOOL: 0
RECTAL PAIN: 0
ANAL BLEEDING: 0
COLOR CHANGE: 0
ABDOMINAL PAIN: 0

## 2022-05-26 ASSESSMENT — SOCIAL DETERMINANTS OF HEALTH (SDOH): HOW HARD IS IT FOR YOU TO PAY FOR THE VERY BASICS LIKE FOOD, HOUSING, MEDICAL CARE, AND HEATING?: NOT HARD AT ALL

## 2022-05-26 ASSESSMENT — PATIENT HEALTH QUESTIONNAIRE - PHQ9
SUM OF ALL RESPONSES TO PHQ QUESTIONS 1-9: 0
4. FEELING TIRED OR HAVING LITTLE ENERGY: 0
3. TROUBLE FALLING OR STAYING ASLEEP: 0
SUM OF ALL RESPONSES TO PHQ QUESTIONS 1-9: 0
5. POOR APPETITE OR OVEREATING: 0
2. FEELING DOWN, DEPRESSED OR HOPELESS: 0
8. MOVING OR SPEAKING SO SLOWLY THAT OTHER PEOPLE COULD HAVE NOTICED. OR THE OPPOSITE, BEING SO FIGETY OR RESTLESS THAT YOU HAVE BEEN MOVING AROUND A LOT MORE THAN USUAL: 0
6. FEELING BAD ABOUT YOURSELF - OR THAT YOU ARE A FAILURE OR HAVE LET YOURSELF OR YOUR FAMILY DOWN: 0
7. TROUBLE CONCENTRATING ON THINGS, SUCH AS READING THE NEWSPAPER OR WATCHING TELEVISION: 0
SUM OF ALL RESPONSES TO PHQ QUESTIONS 1-9: 0
9. THOUGHTS THAT YOU WOULD BE BETTER OFF DEAD, OR OF HURTING YOURSELF: 0
SUM OF ALL RESPONSES TO PHQ QUESTIONS 1-9: 0
10. IF YOU CHECKED OFF ANY PROBLEMS, HOW DIFFICULT HAVE THESE PROBLEMS MADE IT FOR YOU TO DO YOUR WORK, TAKE CARE OF THINGS AT HOME, OR GET ALONG WITH OTHER PEOPLE: 0

## 2022-05-26 ASSESSMENT — ANXIETY QUESTIONNAIRES
IF YOU CHECKED OFF ANY PROBLEMS ON THIS QUESTIONNAIRE, HOW DIFFICULT HAVE THESE PROBLEMS MADE IT FOR YOU TO DO YOUR WORK, TAKE CARE OF THINGS AT HOME, OR GET ALONG WITH OTHER PEOPLE: SOMEWHAT DIFFICULT
4. TROUBLE RELAXING: 1
3. WORRYING TOO MUCH ABOUT DIFFERENT THINGS: 1
6. BECOMING EASILY ANNOYED OR IRRITABLE: 1
GAD7 TOTAL SCORE: 9
7. FEELING AFRAID AS IF SOMETHING AWFUL MIGHT HAPPEN: 0
5. BEING SO RESTLESS THAT IT IS HARD TO SIT STILL: 0
2. NOT BEING ABLE TO STOP OR CONTROL WORRYING: 3
1. FEELING NERVOUS, ANXIOUS, OR ON EDGE: 3

## 2022-05-26 ASSESSMENT — VISUAL ACUITY: OU: 1

## 2022-05-26 NOTE — PATIENT INSTRUCTIONS
Patient Education        Well Visit, Ages 25 to 48: Care Instructions  Overview     Well visits can help you stay healthy. Your doctor has checked your overall health and may have suggested ways to take good care of yourself. Your doctor also may have recommended tests. At home, you can help prevent illness withhealthy eating, regular exercise, and other steps. Follow-up care is a key part of your treatment and safety. Be sure to make and go to all appointments, and call your doctor if you are having problems. It's also a good idea to know your test results and keep alist of the medicines you take. How can you care for yourself at home?  Get screening tests that you and your doctor decide on. Screening helps find diseases before any symptoms appear.  Eat healthy foods. Choose fruits, vegetables, whole grains, protein, and low-fat dairy foods. Limit fat, especially saturated fat. Reduce salt in your diet.  Limit alcohol. If you are a man, have no more than 2 drinks a day or 14 drinks a week. If you are a woman, have no more than 1 drink a day or 7 drinks a week.  Get at least 30 minutes of physical activity on most days of the week. Walking is a good choice. You also may want to do other activities, such as running, swimming, cycling, or playing tennis or team sports. Discuss any changes in your exercise program with your doctor.  Reach and stay at a healthy weight. This will lower your risk for many problems, such as obesity, diabetes, heart disease, and high blood pressure.  Do not smoke or allow others to smoke around you. If you need help quitting, talk to your doctor about stop-smoking programs and medicines. These can increase your chances of quitting for good.  Care for your mental health. It is easy to get weighed down by worry and stress. Learn strategies to manage stress, like deep breathing and mindfulness, and stay connected with your family and community.  If you find you often feel sad or hopeless, talk with your doctor. Treatment can help.  Talk to your doctor about whether you have any risk factors for sexually transmitted infections (STIs). You can help prevent STIs if you wait to have sex with a new partner (or partners) until you've each been tested for STIs. It also helps if you use condoms (male or female condoms) and if you limit your sex partners to one person who only has sex with you. Vaccines are available for some STIs, such as HPV.  Use birth control if it's important to you to prevent pregnancy. Talk with your doctor about the choices available and what might be best for you.  If you think you may have a problem with alcohol or drug use, talk to your doctor. This includes prescription medicines (such as amphetamines and opioids) and illegal drugs (such as cocaine and methamphetamine). Your doctor can help you figure out what type of treatment is best for you.  Protect your skin from too much sun. When you're outdoors from 10 a.m. to 4 p.m., stay in the shade or cover up with clothing and a hat with a wide brim. Wear sunglasses that block UV rays. Even when it's cloudy, put broad-spectrum sunscreen (SPF 30 or higher) on any exposed skin.  See a dentist one or two times a year for checkups and to have your teeth cleaned.  Wear a seat belt in the car. When should you call for help? Watch closely for changes in your health, and be sure to contact your doctor if you have any problems or symptoms that concern you. Where can you learn more? Go to https://NanoPackbasil.healthBooster. org and sign in to your [employer] account. Enter P072 in the [facility] box to learn more about \"Well Visit, Ages 25 to 48: Care Instructions. \"     If you do not have an account, please click on the \"Sign Up Now\" link. Current as of: October 6, 2021               Content Version: 13.2  © 4823-2197 Healthwise, Incorporated. Care instructions adapted under license by [facility] ([facility]). If you have questions about a medical condition or this instruction, always ask your healthcare professional. [address] any warranty or liability for your use of this information. Patient Education        Cholesterol and Triglycerides Tests: About These Tests  What are they? Cholesterol and triglycerides tests measure the amount of fats in your blood. These fats have both \"good\" (HDL) and \"bad\" (LDL) cholesterol. Why are these tests done? These tests are done to help find out your risk of a heart attack and stroke. Your doctor uses your cholesterol levels plus other things such as blood pressure, age, and sex to calculate your risk. These tests also help yourdoctor find out how well medicine is working for some health problems. How do you prepare for these tests?  Your doctor may ask you to not eat or drink anything except water for 9 to 14 hours before the tests. In most cases, you can take your medicines with water the morning of the test.   Do not drink alcohol for 24 hours before the tests.  Tell your doctor ALL the medicines, vitamins, supplements, and herbal remedies you take. Some may increase the risk of problems during your test. Your doctor will tell you if you should stop taking any of them before the test and how soon to do it. How are these tests done? A health professional uses a needle to take a blood sample, usually from thearm. Where can you learn more? Go to https://[employer].MassBioEd. org and sign in to your [employer] account. Enter N822 in the [facility] box to learn more about \"Cholesterol and Triglycerides Tests: About These Tests. \"     If you do not have an account, please click on the \"Sign Up Now\" link. Current as of: January 10, 2022               Content Version: 13.2  © 9677-3027 Healthwise, Incorporated. Care instructions adapted under license by [facility] ([facility]).  If you have questions about a medical condition or this instruction, always ask your healthcare professional. [place] [address] any warranty or liability for your use of this information.

## 2022-05-26 NOTE — PROGRESS NOTES
Lane Belle : 2000 Sex: female  Age: 25 y.o. Chief Complaint   Patient presents with    Anxiety     having anxiety     Medication Check     stopped metoprolol        Assessment and Plan:  Isadora was seen today for anxiety and medication check. Diagnoses and all orders for this visit:    General medical examination  -     EKG 12 Lead; Future  -     CBC with Auto Differential; Future  -     Comprehensive Metabolic Panel, Fasting; Future  -     EKG 12 Lead    Screening for thyroid disorder  -     TSH; Future    Screening for lipoid disorders  -     Lipid Panel; Future    Attention deficit disorder (ADD) without hyperactivity  -     amphetamine-dextroamphetamine (ADDERALL XR) 10 MG extended release capsule; Take 1 capsule by mouth every morning for 30 days. Return in about 4 weeks (around 2022). Educational materials printed for patient's review and were included in patient instructions on her After Visit Summary and given to patient at the end of visit. Counseled regarding above diagnosis, including possible risks and complications,  especially if left uncontrolled. Counseled regarding the possible side effects, risks, benefits and alternatives to treatment; patient and/or guardian verbalizes understanding, agrees, feels comfortable with and wishes to proceed with above treatment plan. Advised patient to call with any new medication issues, and read all Rx info from pharmacy to assure aware of all possible risks and side effects of medication before taking. Reviewed age and gender appropriate health screening exams and vaccinations. Advised patient regarding importance of keeping up with recommended health maintenance and to schedule as soon as possible if overdue, as this is important in assessing for undiagnosed pathology, especially cancer, as well as protecting against potentially harmful/life threatening disease.        Patient verbalizes understanding and agrees with above counseling, assessment and plan. All questions answered. On 05/26/22 I have spent 30 reviewing previous notes, test results and face to face with the patient discussing the diagnosis and importance of compliance with the treatment plan as well as documenting on the day of the visit. Educational materials exercises printed for patient's review and were included in patient instructions on their After Visit Summary and given to patient at the end of visit.      USPTF:      (Blood pressure 130/78) High Blood Pressure: Screening and Home Monitoring -- Adults  Grade: A (Recommended) recommends screening for high blood pressure in ages 25 years or older. obtain measurements outside of the clinical setting for diagnostic confirmation before starting treatment. Annual screening for adults aged 36 years or older or those who are at increased risk for blood pressure    (  )  Lipid Disorders in Adults: Screening -- Men 28 and Older  Grade: A (Recommended) recommends screening men aged 28 and older for lipid disorders. (Social) Alcohol Misuse: Screening and Behavioral Counseling Interventions in Primary Care -- Adults  Grade: B (Recommended) recommends that clinicians screen adults aged 25 years or older for alcohol misuse and provide persons engaged in risky or hazardous drinking with brief behavioral counseling interventions to reduce alcohol misuse. (  ) Abnormal Blood Glucose and Type 2 Diabetes Mellitus: Screening -- Adults aged 36 to 79 years who are overweight or obese Grade: B (Recommended)    (BMI 30.94)  Obesity: Screening for and Management of-- All Adults  Grade: B(Recommended) recommends screening all adults for obesity. Clinicians should offer or refer patients with a body mass index (BMI) of 30 kg/m2 or higher to intensive, multicomponent behavioral interventions.          (Not a fall risk)  Fall Prevention -- Exercise/Physical Therapy: Community-dwelling Adults 65 Years or Older, Increased Risk for Falls   Grade: B (Recommended) recommends exercise or physical therapy to prevent falls in community-dwelling adults aged 72 years or older who are at increased risk for falls. (No new symptoms noted or reported today)  Depression: Screening -- General adult population, including pregnant and postpartum women  Grade: B(Recommended) recommends screening for depression in the general adult population,  Screening should be implemented with adequate systems in place to ensure accurate diagnosis, effective treatment, and appropriate follow-up. (  ) Thyroid Dysfunction: Screening --      (2020) Cervical Cancer: Screening -- Women 21 to 72 (Pap Smear) or 30-65 (in combo with HPV testing)  Grade: A(Recommended)    (  ) Breast Cancer: Screening with Mammography-- Women aged 48 to 76 years  Grade: B (Recommended) recommends biennial screening mammography for women aged 48 to 76 years. Did not do well on the medication the Metoprolol made her tired and nauseous. Has not been on it for a few days and feeling better feeling normal. Her focus is off and she is having issues with completing tasks. Having trouble with work and focus and is concerned about it    LAST VISIT  Stopped the med Lexapro 2 weeks by breaking it in half for one week and then stopped it now is feeling not so well is getting more anxiety and shaking and is really trying to avoid any medications that causes weight gain. PRIOR VISIT  Patient is doing much better now on medications she still is not able to taste much but feels like and likes the way she feels on the Lexapro. She understands that this may not be something that she needs to stay on but we talked about weaning off the medication when she felt like it was time.   PRIOR VISIT  3 months now with nausea and vomiting and abdominal cramping and is thinking she is having some anxiety and is subsequently having fog and fatigue and is having trouble getting in with counselor due to the virtual and Covid concerns she thinks that it is all stemming from this anxiety related to the initial diagnosis. No suicidal or homicidal ideations reported    PRIOR VISIT  After only 1 course of prednisone she is noticing a big improvement in her triggers. No new symptomology reported. Date of surgery March 17 th, Symptoms started 24 hours after surgery. It has only progressed after that started with smell and taste. Described as moldy food. Was able to tolerate anything dairy. Biggest triggers meat, smoke, Gas Fumes and chemicals like bleach. Was told it should subside within two weeks but it has only progressed. She gets very nauseous and vomits with her biggest triggers. No major weight gain or loss reported. Med's Used in Anesthesia     sevoflurane (ULTANE) inhalation charge 1 Each  propofol (DIPRIVAN/PROPOVEN) injection 200 mg  propofol (DIPRIVAN/PROPOVEN) continuous infusion Standard Conc 10mg/mL 1,158,235 mcg  fentaNYL (SUBLIMAZE) 50 mcg/mL injection 75 mcg  remifentanil (ULTIVA) 1 mg injection 2,772.33 mcg  midazolam (VERSED) 1 mg/mL IV 2 mg  rocuronium (ZEMURON) 10 mg/ml injection 50 mg  dexamethasone (DECADRON) injection 4 mg/mL 4 mg  ondansetron (ZOFRAN) 2 mg/mL injection 8 mg  ketorolac (TORADOL) IV             15 mg  acetaminophen (OFIRMEV) IV 1,000 mg  lidocaine 1% injection                         50 mg  ceFAZolin (ANCEF) 1000 mg vial 4,000 mg  phenylephrine 10 mg/mL injection 280 mcg  calcium chloride 10% (100 mg/mL) injection 1,000 mg  morphine (PF) 1mg/ml injection 450 mcg  morphine (PF) 1mg/ml injection 4 mg  glycopyrrolate (ROBINUL)                  2 mg/mL injection    0.2 mcg  neostigmine (BLOXIVERZ)                 1 mg/mL injection    1 mg  dexmedetomidine (PRECEDEX) 4 mcg/mL injection               12 mcg    Abdominal Pain  Pertinent negatives include no fever, headaches or hematuria.    Other  Pertinent negatives include no abdominal pain, chest pain, fever or headaches. Review of Systems   Constitutional: Negative for activity change and fever. HENT: Negative for nosebleeds. Cardiovascular: Negative for chest pain, palpitations and leg swelling. Gastrointestinal: Negative for abdominal pain, anal bleeding, blood in stool and rectal pain. Genitourinary: Negative for hematuria. Skin: Negative for color change and pallor. Neurological: Negative for light-headedness and headaches. Hematological: Does not bruise/bleed easily. Current Outpatient Medications:     amphetamine-dextroamphetamine (ADDERALL XR) 10 MG extended release capsule, Take 1 capsule by mouth every morning for 30 days. , Disp: 30 capsule, Rfl: 0    ondansetron (ZOFRAN) 4 MG tablet, Take 1 tablet by mouth daily as needed for Nausea or Vomiting, Disp: 30 tablet, Rfl: 3    acetaminophen (TYLENOL) 500 MG tablet, Take 500 mg by mouth every 6 hours as needed for Pain, Disp: , Rfl:     Misc Natural Products (AIRBORNE ELDERBERRY) CHEW, Take by mouth daily , Disp: , Rfl:     Ascorbic Acid (VITAMIN C) 500 MG CHEW, Take by mouth daily, Disp: , Rfl:     metoprolol succinate (TOPROL XL) 25 MG extended release tablet, Take 0.5 tablets by mouth daily (Patient not taking: Reported on 5/26/2022), Disp: 45 tablet, Rfl: 1  Allergies   Allergen Reactions    Morphine Nausea And Vomiting       History reviewed. No pertinent past medical history.   Past Surgical History:   Procedure Laterality Date    RHINOPLASTY  1    SPINAL FUSION  03/17/2021     Family History   Problem Relation Age of Onset    Rheum Arthritis Mother     Rheum Arthritis Father     Rashes/Skin Problems Brother      Social History     Socioeconomic History    Marital status: Single     Spouse name: Not on file    Number of children: Not on file    Years of education: Not on file    Highest education level: Not on file   Occupational History    Not on file   Tobacco Use    Smoking status: Never Smoker    Smokeless tobacco: Never Used   Vaping Use    Vaping Use: Never used   Substance and Sexual Activity    Alcohol use: Never    Drug use: Never    Sexual activity: Not on file   Other Topics Concern    Not on file   Social History Narrative    ** Merged History Encounter **          Social Determinants of Health     Financial Resource Strain: Low Risk     Difficulty of Paying Living Expenses: Not hard at all   Food Insecurity: No Food Insecurity    Worried About Running Out of Food in the Last Year: Never true    Dru of Food in the Last Year: Never true   Transportation Needs:     Lack of Transportation (Medical): Not on file    Lack of Transportation (Non-Medical): Not on file   Physical Activity:     Days of Exercise per Week: Not on file    Minutes of Exercise per Session: Not on file   Stress:     Feeling of Stress : Not on file   Social Connections:     Frequency of Communication with Friends and Family: Not on file    Frequency of Social Gatherings with Friends and Family: Not on file    Attends [employer] Services: Not on file    Active Member of [address] [place] [address] or Organizations: Not on file    Attends Club or Organization Meetings: Not on file    Marital Status: Not on file   Intimate Partner Violence:     Fear of Current or Ex-Partner: Not on file    Emotionally Abused: Not on file    Physically Abused: Not on file    Sexually Abused: Not on file   Housing Stability:     Unable to Pay for Housing in the Last Year: Not on file    Number of [place] in the Last Year: Not on file    Unstable Housing in the Last Year: Not on file       Vitals:    05/26/22 1535   BP: 120/70   Pulse: 84   Resp: 18   Temp: 97.5 °F (36.4 °C)   TempSrc: Temporal   SpO2: 98%   Weight: 201 lb (91.2 kg)   Height: 5' 7.25\" (1.708 m)       Physical Exam  Constitutional:       Appearance: Normal appearance. She is normal weight. HENT:      Head: Normocephalic and atraumatic.       Right Ear: Tympanic membrane and ear canal normal. No decreased hearing noted. No drainage or tenderness. No middle ear effusion. There is no impacted cerumen. No foreign body. Tympanic membrane is not injected, scarred, perforated or erythematous. Left Ear: Tympanic membrane normal. No decreased hearing noted. No drainage or tenderness. No middle ear effusion. There is no impacted cerumen. No foreign body. Tympanic membrane is not injected, scarred, perforated or erythematous. Nose: No congestion or rhinorrhea. Right Sinus: No maxillary sinus tenderness or frontal sinus tenderness. Left Sinus: No maxillary sinus tenderness or frontal sinus tenderness. Mouth/Throat:      Mouth: Mucous membranes are dry. Pharynx: Oropharynx is clear. No oropharyngeal exudate or posterior oropharyngeal erythema. Eyes:      General: Lids are normal. Vision grossly intact. No allergic shiner. Right eye: No discharge. Left eye: No discharge. Extraocular Movements: Extraocular movements intact. Right eye: No nystagmus. Left eye: No nystagmus. Conjunctiva/sclera: Conjunctivae normal.      Right eye: Right conjunctiva is not injected. No exudate or hemorrhage. Left eye: Left conjunctiva is not injected. No exudate or hemorrhage. Pupils: Pupils are equal, round, and reactive to light. Neck:      Thyroid: No thyromegaly. Meningeal: Brudzinski's sign and Kernig's sign absent. Cardiovascular:      Rate and Rhythm: Normal rate and regular rhythm. Heart sounds: Normal heart sounds. Pulmonary:      Effort: No retractions. Breath sounds: No decreased breath sounds, wheezing, rhonchi or rales. Abdominal:      Tenderness: There is no abdominal tenderness. Musculoskeletal:         General: Normal range of motion. Cervical back: Normal range of motion. No rigidity. No muscular tenderness. Lymphadenopathy:      Cervical: No cervical adenopathy.       Right cervical: No superficial, deep or posterior cervical adenopathy. Left cervical: No superficial, deep or posterior cervical adenopathy. Skin:     General: Skin is warm. Neurological:      Mental Status: She is alert.               Seen By:  DANNY Iraheta - CNP

## 2022-05-27 LAB
ALBUMIN SERPL-MCNC: 4.4 G/DL (ref 3.5–5.2)
ALP BLD-CCNC: 62 U/L (ref 35–104)
ALT SERPL-CCNC: 32 U/L (ref 0–32)
ANION GAP SERPL CALCULATED.3IONS-SCNC: 9 MMOL/L (ref 7–16)
AST SERPL-CCNC: 29 U/L (ref 0–31)
BILIRUB SERPL-MCNC: 0.5 MG/DL (ref 0–1.2)
BUN BLDV-MCNC: 10 MG/DL (ref 6–20)
CALCIUM SERPL-MCNC: 9.1 MG/DL (ref 8.6–10.2)
CHLORIDE BLD-SCNC: 105 MMOL/L (ref 98–107)
CHOLESTEROL, TOTAL: 171 MG/DL (ref 0–199)
CO2: 26 MMOL/L (ref 22–29)
CREAT SERPL-MCNC: 1.1 MG/DL (ref 0.5–1)
GFR AFRICAN AMERICAN: >60
GFR NON-AFRICAN AMERICAN: >60 ML/MIN/1.73
GLUCOSE FASTING: 80 MG/DL (ref 74–99)
HDLC SERPL-MCNC: 63 MG/DL
LDL CHOLESTEROL CALCULATED: 95 MG/DL (ref 0–99)
POTASSIUM SERPL-SCNC: 4 MMOL/L (ref 3.5–5)
SODIUM BLD-SCNC: 140 MMOL/L (ref 132–146)
TOTAL PROTEIN: 7 G/DL (ref 6.4–8.3)
TRIGL SERPL-MCNC: 67 MG/DL (ref 0–149)
TSH SERPL DL<=0.05 MIU/L-ACNC: 0.56 UIU/ML (ref 0.27–4.2)
VLDLC SERPL CALC-MCNC: 13 MG/DL

## 2022-06-23 ENCOUNTER — OFFICE VISIT (OUTPATIENT)
Dept: FAMILY MEDICINE CLINIC | Age: 22
End: 2022-06-23
Payer: COMMERCIAL

## 2022-06-23 VITALS
DIASTOLIC BLOOD PRESSURE: 70 MMHG | HEART RATE: 87 BPM | SYSTOLIC BLOOD PRESSURE: 100 MMHG | BODY MASS INDEX: 30.49 KG/M2 | RESPIRATION RATE: 17 BRPM | TEMPERATURE: 97 F | WEIGHT: 194.3 LBS | OXYGEN SATURATION: 98 % | HEIGHT: 67 IN

## 2022-06-23 DIAGNOSIS — F41.9 ANXIETY: ICD-10-CM

## 2022-06-23 DIAGNOSIS — F33.0 MILD EPISODE OF RECURRENT MAJOR DEPRESSIVE DISORDER (HCC): ICD-10-CM

## 2022-06-23 DIAGNOSIS — F98.8 ATTENTION DEFICIT DISORDER (ADD) WITHOUT HYPERACTIVITY: Primary | ICD-10-CM

## 2022-06-23 PROCEDURE — 99213 OFFICE O/P EST LOW 20 MIN: CPT | Performed by: NURSE PRACTITIONER

## 2022-06-23 RX ORDER — DEXTROAMPHETAMINE SACCHARATE, AMPHETAMINE ASPARTATE, DEXTROAMPHETAMINE SULFATE AND AMPHETAMINE SULFATE 2.5; 2.5; 2.5; 2.5 MG/1; MG/1; MG/1; MG/1
10 TABLET ORAL 2 TIMES DAILY
Qty: 60 TABLET | Refills: 0 | Status: SHIPPED
Start: 2022-06-23 | End: 2022-07-28 | Stop reason: SDUPTHER

## 2022-06-23 ASSESSMENT — PATIENT HEALTH QUESTIONNAIRE - PHQ9
SUM OF ALL RESPONSES TO PHQ QUESTIONS 1-9: 0
SUM OF ALL RESPONSES TO PHQ9 QUESTIONS 1 & 2: 0
SUM OF ALL RESPONSES TO PHQ QUESTIONS 1-9: 0
2. FEELING DOWN, DEPRESSED OR HOPELESS: 0
1. LITTLE INTEREST OR PLEASURE IN DOING THINGS: 0
SUM OF ALL RESPONSES TO PHQ QUESTIONS 1-9: 0
SUM OF ALL RESPONSES TO PHQ QUESTIONS 1-9: 0

## 2022-06-23 ASSESSMENT — VISUAL ACUITY: OU: 1

## 2022-06-23 ASSESSMENT — ENCOUNTER SYMPTOMS
ABDOMINAL PAIN: 0
BLOOD IN STOOL: 0
ANAL BLEEDING: 0
COLOR CHANGE: 0
RECTAL PAIN: 0

## 2022-06-23 NOTE — PROGRESS NOTES
Huy Shoemaker : 2000 Sex: female  Age: 25 y.o. Chief Complaint   Patient presents with    Medication Check     1 month follow up       Assessment and Plan:  Isadora was seen today for medication check. Diagnoses and all orders for this visit:    Attention deficit disorder (ADD) without hyperactivity  -     amphetamine-dextroamphetamine (ADDERALL) 10 MG tablet; Take 1 tablet by mouth 2 times daily for 30 days. Mild episode of recurrent major depressive disorder (HCC)    Anxiety  -     amphetamine-dextroamphetamine (ADDERALL) 10 MG tablet; Take 1 tablet by mouth 2 times daily for 30 days. Return in about 4 weeks (around 2022). Educational materials printed for patient's review and were included in patient instructions on her After Visit Summary and given to patient at the end of visit. Counseled regarding above diagnosis, including possible risks and complications,  especially if left uncontrolled. Counseled regarding the possible side effects, risks, benefits and alternatives to treatment; patient and/or guardian verbalizes understanding, agrees, feels comfortable with and wishes to proceed with above treatment plan. Advised patient to call with any new medication issues, and read all Rx info from pharmacy to assure aware of all possible risks and side effects of medication before taking. Reviewed age and gender appropriate health screening exams and vaccinations. Advised patient regarding importance of keeping up with recommended health maintenance and to schedule as soon as possible if overdue, as this is important in assessing for undiagnosed pathology, especially cancer, as well as protecting against potentially harmful/life threatening disease. Patient verbalizes understanding and agrees with above counseling, assessment and plan. All questions answered.     On 22 I have spent 30 reviewing previous notes, test results and face to face with the patient discussing the diagnosis and importance of compliance with the treatment plan as well as documenting on the day of the visit. Educational materials exercises printed for patient's review and were included in patient instructions on their After Visit Summary and given to patient at the end of visit.      USPTF:      (Blood pressure 130/78) High Blood Pressure: Screening and Home Monitoring -- Adults  Grade: A (Recommended) recommends screening for high blood pressure in ages 25 years or older. obtain measurements outside of the clinical setting for diagnostic confirmation before starting treatment. Annual screening for adults aged 36 years or older or those who are at increased risk for blood pressure    (, TRI 67, LDL 95)  Lipid Disorders in Adults: Screening -- Men 28 and Older  Grade: A (Recommended) recommends screening men aged 28 and older for lipid disorders. (Social) Alcohol Misuse: Screening and Behavioral Counseling Interventions in Primary Care -- Adults  Grade: B (Recommended) recommends that clinicians screen adults aged 25 years or older for alcohol misuse and provide persons engaged in risky or hazardous drinking with brief behavioral counseling interventions to reduce alcohol misuse. (  ) Abnormal Blood Glucose and Type 2 Diabetes Mellitus: Screening -- Adults aged 36 to 79 years who are overweight or obese Grade: B (Recommended)    (BMI 30.21)  Obesity: Screening for and Management of-- All Adults  Grade: B(Recommended) recommends screening all adults for obesity. Clinicians should offer or refer patients with a body mass index (BMI) of 30 kg/m2 or higher to intensive, multicomponent behavioral interventions.          (Not a fall risk)  Fall Prevention -- Exercise/Physical Therapy: Community-dwelling Adults 72 Years or Older, Increased Risk for Falls   Grade: B (Recommended) recommends exercise or physical therapy to prevent falls in community-dwelling adults aged 72 years or older who are at increased risk for falls. (No new symptoms noted or reported today)  Depression: Screening -- General adult population, including pregnant and postpartum women  Grade: B(Recommended) recommends screening for depression in the general adult population,  Screening should be implemented with adequate systems in place to ensure accurate diagnosis, effective treatment, and appropriate follow-up. (  ) Thyroid Dysfunction: Screening --      (2020) Cervical Cancer: Screening -- Women 21 to 72 (Pap Smear) or 30-65 (in combo with HPV testing)  Grade: A(Recommended)    (  ) Breast Cancer: Screening with Mammography-- Women aged 48 to 76 years  Grade: B (Recommended) recommends biennial screening mammography for women aged 48 to 76 years. Did not do well on the medication the Metoprolol made her tired and nauseous. Has not been on it for a few days and feeling better feeling normal. Her focus is off and she is having issues with completing tasks. Having trouble with work and focus and is concerned about it    LAST VISIT  Stopped the med Lexapro 2 weeks by breaking it in half for one week and then stopped it now is feeling not so well is getting more anxiety and shaking and is really trying to avoid any medications that causes weight gain. PRIOR VISIT  Patient is doing much better now on medications she still is not able to taste much but feels like and likes the way she feels on the Lexapro. She understands that this may not be something that she needs to stay on but we talked about weaning off the medication when she felt like it was time. PRIOR VISIT  3 months now with nausea and vomiting and abdominal cramping and is thinking she is having some anxiety and is subsequently having fog and fatigue and is having trouble getting in with counselor due to the virtual and Covid concerns she thinks that it is all stemming from this anxiety related to the initial diagnosis.   No suicidal or homicidal ideations reported    PRIOR VISIT  After only 1 course of prednisone she is noticing a big improvement in her triggers. No new symptomology reported. Date of surgery March 17 th, Symptoms started 24 hours after surgery. It has only progressed after that started with smell and taste. Described as moldy food. Was able to tolerate anything dairy. Biggest triggers meat, smoke, Gas Fumes and chemicals like bleach. Was told it should subside within two weeks but it has only progressed. She gets very nauseous and vomits with her biggest triggers. No major weight gain or loss reported. Med's Used in Anesthesia     sevoflurane (ULTANE) inhalation charge 1 Each  propofol (DIPRIVAN/PROPOVEN) injection 200 mg  propofol (DIPRIVAN/PROPOVEN) continuous infusion Standard Conc 10mg/mL 1,158,235 mcg  fentaNYL (SUBLIMAZE) 50 mcg/mL injection 75 mcg  remifentanil (ULTIVA) 1 mg injection 2,772.33 mcg  midazolam (VERSED) 1 mg/mL IV 2 mg  rocuronium (ZEMURON) 10 mg/ml injection 50 mg  dexamethasone (DECADRON) injection 4 mg/mL 4 mg  ondansetron (ZOFRAN) 2 mg/mL injection 8 mg  ketorolac (TORADOL) IV             15 mg  acetaminophen (OFIRMEV) IV 1,000 mg  lidocaine 1% injection                         50 mg  ceFAZolin (ANCEF) 1000 mg vial 4,000 mg  phenylephrine 10 mg/mL injection 280 mcg  calcium chloride 10% (100 mg/mL) injection 1,000 mg  morphine (PF) 1mg/ml injection 450 mcg  morphine (PF) 1mg/ml injection 4 mg  glycopyrrolate (ROBINUL)                  2 mg/mL injection    0.2 mcg  neostigmine (BLOXIVERZ)                 1 mg/mL injection    1 mg  dexmedetomidine (PRECEDEX) 4 mcg/mL injection               12 mcg    Abdominal Pain  Pertinent negatives include no fever, headaches or hematuria. Other  Pertinent negatives include no abdominal pain, chest pain, fever or headaches. Review of Systems   Constitutional: Negative for activity change and fever. HENT: Negative for nosebleeds. Resource Strain: Low Risk     Difficulty of Paying Living Expenses: Not hard at all   Food Insecurity: No Food Insecurity    Worried About Running Out of Food in the Last Year: Never true    Dru of Food in the Last Year: Never true   Transportation Needs:     Lack of Transportation (Medical): Not on file    Lack of Transportation (Non-Medical): Not on file   Physical Activity:     Days of Exercise per Week: Not on file    Minutes of Exercise per Session: Not on file   Stress:     Feeling of Stress : Not on file   Social Connections:     Frequency of Communication with Friends and Family: Not on file    Frequency of Social Gatherings with Friends and Family: Not on file    Attends Evangelical Services: Not on file    Active Member of Clubs or Organizations: Not on file    Attends Club or Organization Meetings: Not on file    Marital Status: Not on file   Intimate Partner Violence:     Fear of Current or Ex-Partner: Not on file    Emotionally Abused: Not on file    Physically Abused: Not on file    Sexually Abused: Not on file   Housing Stability:     Unable to Pay for Housing in the Last Year: Not on file    Number of [place] in the Last Year: Not on file    Unstable Housing in the Last Year: Not on file       Vitals:    06/23/22 1547   BP: 100/70   Site: Right Upper Arm   Position: Sitting   Cuff Size: Large Adult   Pulse: 87   Resp: 17   Temp: 97 °F (36.1 °C)   TempSrc: Temporal   SpO2: 98%   Weight: 194 lb 4.8 oz (88.1 kg)   Height: 5' 7.25\" (1.708 m)       Physical Exam  Constitutional:       Appearance: Normal appearance. She is normal weight. HENT:      Head: Normocephalic and atraumatic. Right Ear: Tympanic membrane and ear canal normal. No decreased hearing noted. No drainage or tenderness. No middle ear effusion. There is no impacted cerumen. No foreign body. Tympanic membrane is not injected, scarred, perforated or erythematous.       Left Ear: Tympanic membrane normal. No decreased hearing noted. No drainage or tenderness. No middle ear effusion. There is no impacted cerumen. No foreign body. Tympanic membrane is not injected, scarred, perforated or erythematous. Nose: No congestion or rhinorrhea. Right Sinus: No maxillary sinus tenderness or frontal sinus tenderness. Left Sinus: No maxillary sinus tenderness or frontal sinus tenderness. Mouth/Throat:      Mouth: Mucous membranes are dry. Pharynx: Oropharynx is clear. No oropharyngeal exudate or posterior oropharyngeal erythema. Eyes:      General: Lids are normal. Vision grossly intact. No allergic shiner. Right eye: No discharge. Left eye: No discharge. Extraocular Movements: Extraocular movements intact. Right eye: No nystagmus. Left eye: No nystagmus. Conjunctiva/sclera: Conjunctivae normal.      Right eye: Right conjunctiva is not injected. No exudate or hemorrhage. Left eye: Left conjunctiva is not injected. No exudate or hemorrhage. Pupils: Pupils are equal, round, and reactive to light. Neck:      Thyroid: No thyromegaly. Meningeal: Brudzinski's sign and Kernig's sign absent. Cardiovascular:      Rate and Rhythm: Normal rate and regular rhythm. Heart sounds: Normal heart sounds. Pulmonary:      Effort: No retractions. Breath sounds: No decreased breath sounds, wheezing, rhonchi or rales. Abdominal:      Tenderness: There is no abdominal tenderness. Musculoskeletal:         General: Normal range of motion. Cervical back: Normal range of motion. No rigidity. No muscular tenderness. Lymphadenopathy:      Cervical: No cervical adenopathy. Right cervical: No superficial, deep or posterior cervical adenopathy. Left cervical: No superficial, deep or posterior cervical adenopathy. Skin:     General: Skin is warm. Neurological:      Mental Status: She is alert.               Seen By:  Ananda Goodwin, DANNY - CNP

## 2022-07-28 ENCOUNTER — OFFICE VISIT (OUTPATIENT)
Dept: FAMILY MEDICINE CLINIC | Age: 22
End: 2022-07-28
Payer: COMMERCIAL

## 2022-07-28 VITALS
BODY MASS INDEX: 29.7 KG/M2 | DIASTOLIC BLOOD PRESSURE: 78 MMHG | HEIGHT: 67 IN | OXYGEN SATURATION: 96 % | SYSTOLIC BLOOD PRESSURE: 122 MMHG | TEMPERATURE: 97.7 F | HEART RATE: 100 BPM | WEIGHT: 189.2 LBS

## 2022-07-28 DIAGNOSIS — F98.8 ATTENTION DEFICIT DISORDER (ADD) WITHOUT HYPERACTIVITY: ICD-10-CM

## 2022-07-28 DIAGNOSIS — F41.9 ANXIETY: ICD-10-CM

## 2022-07-28 PROCEDURE — 99213 OFFICE O/P EST LOW 20 MIN: CPT | Performed by: NURSE PRACTITIONER

## 2022-07-28 RX ORDER — DEXTROAMPHETAMINE SACCHARATE, AMPHETAMINE ASPARTATE, DEXTROAMPHETAMINE SULFATE AND AMPHETAMINE SULFATE 2.5; 2.5; 2.5; 2.5 MG/1; MG/1; MG/1; MG/1
10 TABLET ORAL 2 TIMES DAILY
Qty: 60 TABLET | Refills: 0 | Status: SHIPPED
Start: 2022-07-28 | End: 2022-11-01 | Stop reason: SDUPTHER

## 2022-07-28 ASSESSMENT — ENCOUNTER SYMPTOMS
ANAL BLEEDING: 0
RECTAL PAIN: 0
ABDOMINAL PAIN: 0
BLOOD IN STOOL: 0
COLOR CHANGE: 0

## 2022-07-28 ASSESSMENT — VISUAL ACUITY: OU: 1

## 2022-07-28 NOTE — PROGRESS NOTES
Hussein Landry : 2000 Sex: female  Age: 25 y.o. Chief Complaint   Patient presents with    Medication Check       Assessment and Plan:  Bertram Harrell was seen today for medication check. Diagnoses and all orders for this visit:    Attention deficit disorder (ADD) without hyperactivity  -     amphetamine-dextroamphetamine (ADDERALL) 10 MG tablet; Take 1 tablet by mouth in the morning and 1 tablet before bedtime. Do all this for 30 days. Anxiety  -     amphetamine-dextroamphetamine (ADDERALL) 10 MG tablet; Take 1 tablet by mouth in the morning and 1 tablet before bedtime. Do all this for 30 days. Return for 3 month Medication Follow up. Educational materials printed for patient's review and were included in patient instructions on her After Visit Summary and given to patient at the end of visit. Counseled regarding above diagnosis, including possible risks and complications,  especially if left uncontrolled. Counseled regarding the possible side effects, risks, benefits and alternatives to treatment; patient and/or guardian verbalizes understanding, agrees, feels comfortable with and wishes to proceed with above treatment plan. Advised patient to call with any new medication issues, and read all Rx info from pharmacy to assure aware of all possible risks and side effects of medication before taking. Reviewed age and gender appropriate health screening exams and vaccinations. Advised patient regarding importance of keeping up with recommended health maintenance and to schedule as soon as possible if overdue, as this is important in assessing for undiagnosed pathology, especially cancer, as well as protecting against potentially harmful/life threatening disease. Patient verbalizes understanding and agrees with above counseling, assessment and plan. All questions answered.     On 22 I have spent 30 reviewing previous notes, test results and face to face with the patient discussing the diagnosis and importance of compliance with the treatment plan as well as documenting on the day of the visit. Educational materials exercises printed for patient's review and were included in patient instructions on their After Visit Summary and given to patient at the end of visit. USPTF:      (Blood pressure 130/78) High Blood Pressure: Screening and Home Monitoring -- Adults  Grade: A (Recommended) recommends screening for high blood pressure in ages 25 years or older. obtain measurements outside of the clinical setting for diagnostic confirmation before starting treatment. Annual screening for adults aged 36 years or older or those who are at increased risk for blood pressure    (, TRI 67, LDL 95)  Lipid Disorders in Adults: Screening -- Men 28 and Older  Grade: A (Recommended) recommends screening men aged 28 and older for lipid disorders. (Social) Alcohol Misuse: Screening and Behavioral Counseling Interventions in Primary Care -- Adults  Grade: B (Recommended) recommends that clinicians screen adults aged 25 years or older for alcohol misuse and provide persons engaged in risky or hazardous drinking with brief behavioral counseling interventions to reduce alcohol misuse. (  ) Abnormal Blood Glucose and Type 2 Diabetes Mellitus: Screening -- Adults aged 36 to 79 years who are overweight or obese Grade: B (Recommended)    (BMI 30.21)  Obesity: Screening for and Management of-- All Adults  Grade: B(Recommended) recommends screening all adults for obesity. Clinicians should offer or refer patients with a body mass index (BMI) of 30 kg/m2 or higher to intensive, multicomponent behavioral interventions.          (Not a fall risk)  Fall Prevention -- Exercise/Physical Therapy: Community-dwelling Adults 72 Years or Older, Increased Risk for Falls   Grade: B (Recommended) recommends exercise or physical therapy to prevent falls in community-dwelling adults aged 72 years or older who are at increased risk for falls. (No new symptoms noted or reported today)  Depression: Screening -- General adult population, including pregnant and postpartum women  Grade: B(Recommended) recommends screening for depression in the general adult population,  Screening should be implemented with adequate systems in place to ensure accurate diagnosis, effective treatment, and appropriate follow-up. (  ) Thyroid Dysfunction: Screening --      (2020) Cervical Cancer: Screening -- Women 21 to 72 (Pap Smear) or 30-65 (in combo with HPV testing)  Grade: A(Recommended)    (  ) Breast Cancer: Screening with Mammography-- Women aged 48 to 76 years  Grade: B (Recommended) recommends biennial screening mammography for women aged 48 to 76 years. Patient is here today to review medication and have her 3-month checkup on the Adderall. Reports no problems at this time dosage seems to be working well. Feels to be more productive and focusing is exponentially better. There has been no reports of chest pain, palpitations, nausea, vomiting, or uncontrolled weight loss. Denies any shortness of breath. Patient continues to do optimal on the medication which speaks to a good diagnosis and medication matching. LAST VISIT and she decided  Did not do well on the medication the Metoprolol made her tired and nauseous. Has not been on it for a few days and feeling better feeling normal. Her focus is off and she is having issues with completing tasks. Having trouble with work and focus and is concerned about it    PRIOR VISIT  Stopped the med Lexapro 2 weeks by breaking it in half for one week and then stopped it now is feeling not so well is getting more anxiety and shaking and is really trying to avoid any medications that causes weight gain. PRIOR VISIT  Patient is doing much better now on medications she still is not able to taste much but feels like and likes the way she feels on the Lexapro. mg  glycopyrrolate (ROBINUL)                  2 mg/mL injection    0.2 mcg  neostigmine (BLOXIVERZ)                 1 mg/mL injection    1 mg  dexmedetomidine (PRECEDEX) 4 mcg/mL injection               12 mcg          Abdominal Pain  Pertinent negatives include no fever, headaches or hematuria. Other  Pertinent negatives include no abdominal pain, chest pain, fever or headaches. She has  Review of Systems   Constitutional:  Negative for activity change and fever. HENT:  Negative for nosebleeds. Cardiovascular:  Negative for chest pain, palpitations and leg swelling. Gastrointestinal:  Negative for abdominal pain, anal bleeding, blood in stool and rectal pain. Genitourinary:  Negative for hematuria. Skin:  Negative for color change and pallor. Neurological:  Negative for light-headedness and headaches. Hematological:  Does not bruise/bleed easily. Current Outpatient Medications:     amphetamine-dextroamphetamine (ADDERALL) 10 MG tablet, Take 1 tablet by mouth in the morning and 1 tablet before bedtime. Do all this for 30 days. , Disp: 60 tablet, Rfl: 0    ondansetron (ZOFRAN) 4 MG tablet, Take 1 tablet by mouth daily as needed for Nausea or Vomiting, Disp: 30 tablet, Rfl: 3    acetaminophen (TYLENOL) 500 MG tablet, Take 500 mg by mouth every 6 hours as needed for Pain, Disp: , Rfl:     Ascorbic Acid (VITAMIN C) 500 MG CHEW, Take by mouth daily, Disp: , Rfl:     Misc Natural Products (AIRBORNE ELDERBERRY) CHEW, Take by mouth daily  (Patient not taking: Reported on 7/28/2022), Disp: , Rfl:   Allergies   Allergen Reactions    Morphine Nausea And Vomiting       History reviewed. No pertinent past medical history.   Past Surgical History:   Procedure Laterality Date    RHINOPLASTY  2015    SPINAL FUSION  03/17/2021     Family History   Problem Relation Age of Onset    Rheum Arthritis Mother     Rheum Arthritis Father     Rashes/Skin Problems Brother      Social History     Socioeconomic History Marital status: Single     Spouse name: Not on file    Number of children: Not on file    Years of education: Not on file    Highest education level: Not on file   Occupational History    Not on file   Tobacco Use    Smoking status: Never    Smokeless tobacco: Never   Vaping Use    Vaping Use: Never used   Substance and Sexual Activity    Alcohol use: Never    Drug use: Never    Sexual activity: Not on file   Other Topics Concern    Not on file   Social History Narrative    ** Merged History Encounter **          Social Determinants of Health     Financial Resource Strain: Low Risk     Difficulty of Paying Living Expenses: Not hard at all   Food Insecurity: No Food Insecurity    Worried About Running Out of Food in the Last Year: Never true    Ran Out of Food in the Last Year: Never true   Transportation Needs: Not on file   Physical Activity: Not on file   Stress: Not on file   Social Connections: Not on file   Intimate Partner Violence: Not on file   Housing Stability: Not on file       Vitals:    07/28/22 0929   BP: 122/78   Site: Left Upper Arm   Position: Sitting   Cuff Size: Medium Adult   Pulse: 100   Temp: 97.7 °F (36.5 °C)   TempSrc: Temporal   SpO2: 96%   Weight: 189 lb 3.2 oz (85.8 kg)   Height: 5' 7.25\" (1.708 m)       Physical Exam  Constitutional:       Appearance: Normal appearance. She is normal weight. HENT:      Head: Normocephalic and atraumatic. Right Ear: Tympanic membrane and ear canal normal. No decreased hearing noted. No drainage or tenderness. No middle ear effusion. There is no impacted cerumen. No foreign body. Tympanic membrane is not injected, scarred, perforated or erythematous. Left Ear: Tympanic membrane normal. No decreased hearing noted. No drainage or tenderness. No middle ear effusion. There is no impacted cerumen. No foreign body. Tympanic membrane is not injected, scarred, perforated or erythematous. Nose: No congestion or rhinorrhea.       Right Sinus: No maxillary sinus tenderness or frontal sinus tenderness. Left Sinus: No maxillary sinus tenderness or frontal sinus tenderness. Mouth/Throat:      Mouth: Mucous membranes are dry. Pharynx: Oropharynx is clear. No oropharyngeal exudate or posterior oropharyngeal erythema. Eyes:      General: Lids are normal. Vision grossly intact. No allergic shiner. Right eye: No discharge. Left eye: No discharge. Extraocular Movements: Extraocular movements intact. Right eye: No nystagmus. Left eye: No nystagmus. Conjunctiva/sclera: Conjunctivae normal.      Right eye: Right conjunctiva is not injected. No exudate or hemorrhage. Left eye: Left conjunctiva is not injected. No exudate or hemorrhage. Pupils: Pupils are equal, round, and reactive to light. Neck:      Thyroid: No thyromegaly. Meningeal: Brudzinski's sign and Kernig's sign absent. Cardiovascular:      Rate and Rhythm: Normal rate and regular rhythm. Heart sounds: Normal heart sounds. Pulmonary:      Effort: No retractions. Breath sounds: No decreased breath sounds, wheezing, rhonchi or rales. Abdominal:      Tenderness: There is no abdominal tenderness. Musculoskeletal:         General: Normal range of motion. Cervical back: Normal range of motion. No rigidity. No muscular tenderness. Lymphadenopathy:      Cervical: No cervical adenopathy. Right cervical: No superficial, deep or posterior cervical adenopathy. Left cervical: No superficial, deep or posterior cervical adenopathy. Skin:     General: Skin is warm. Neurological:      Mental Status: She is alert.             Seen By:  DANNY Frye CNP

## 2022-11-01 DIAGNOSIS — F41.9 ANXIETY: ICD-10-CM

## 2022-11-01 DIAGNOSIS — F98.8 ATTENTION DEFICIT DISORDER (ADD) WITHOUT HYPERACTIVITY: ICD-10-CM

## 2022-11-01 RX ORDER — DEXTROAMPHETAMINE SACCHARATE, AMPHETAMINE ASPARTATE, DEXTROAMPHETAMINE SULFATE AND AMPHETAMINE SULFATE 2.5; 2.5; 2.5; 2.5 MG/1; MG/1; MG/1; MG/1
10 TABLET ORAL 2 TIMES DAILY
Qty: 60 TABLET | Refills: 0 | Status: SHIPPED
Start: 2022-11-01 | End: 2022-11-30

## 2022-11-30 ENCOUNTER — OFFICE VISIT (OUTPATIENT)
Dept: FAMILY MEDICINE CLINIC | Age: 22
End: 2022-11-30
Payer: COMMERCIAL

## 2022-11-30 VITALS
SYSTOLIC BLOOD PRESSURE: 110 MMHG | BODY MASS INDEX: 28.06 KG/M2 | RESPIRATION RATE: 17 BRPM | DIASTOLIC BLOOD PRESSURE: 68 MMHG | OXYGEN SATURATION: 99 % | HEART RATE: 75 BPM | HEIGHT: 67 IN | WEIGHT: 178.8 LBS | TEMPERATURE: 98.4 F

## 2022-11-30 DIAGNOSIS — F41.9 ANXIETY: ICD-10-CM

## 2022-11-30 DIAGNOSIS — F98.8 ATTENTION DEFICIT DISORDER (ADD) WITHOUT HYPERACTIVITY: ICD-10-CM

## 2022-11-30 PROCEDURE — 99213 OFFICE O/P EST LOW 20 MIN: CPT | Performed by: NURSE PRACTITIONER

## 2022-11-30 RX ORDER — ISOTRETINOIN 40 MG/1
CAPSULE ORAL
COMMUNITY
Start: 2022-11-29

## 2022-11-30 RX ORDER — TRIAMCINOLONE ACETONIDE 1 MG/G
CREAM TOPICAL
COMMUNITY
Start: 2022-09-28

## 2022-11-30 RX ORDER — DEXTROAMPHETAMINE SACCHARATE, AMPHETAMINE ASPARTATE MONOHYDRATE, DEXTROAMPHETAMINE SULFATE AND AMPHETAMINE SULFATE 3.75; 3.75; 3.75; 3.75 MG/1; MG/1; MG/1; MG/1
15 CAPSULE, EXTENDED RELEASE ORAL EVERY MORNING
Qty: 30 CAPSULE | Refills: 0 | Status: SHIPPED | OUTPATIENT
Start: 2022-11-30 | End: 2022-12-30

## 2022-11-30 RX ORDER — DEXTROAMPHETAMINE SACCHARATE, AMPHETAMINE ASPARTATE MONOHYDRATE, DEXTROAMPHETAMINE SULFATE AND AMPHETAMINE SULFATE 5; 5; 5; 5 MG/1; MG/1; MG/1; MG/1
20 CAPSULE, EXTENDED RELEASE ORAL EVERY MORNING
Qty: 30 CAPSULE | Refills: 0 | Status: SHIPPED
Start: 2022-11-30 | End: 2022-11-30

## 2022-11-30 RX ORDER — DEXTROAMPHETAMINE SACCHARATE, AMPHETAMINE ASPARTATE, DEXTROAMPHETAMINE SULFATE AND AMPHETAMINE SULFATE 2.5; 2.5; 2.5; 2.5 MG/1; MG/1; MG/1; MG/1
10 TABLET ORAL 2 TIMES DAILY
Qty: 60 TABLET | Refills: 0 | Status: CANCELLED | OUTPATIENT
Start: 2022-11-30 | End: 2022-12-30

## 2022-11-30 ASSESSMENT — ENCOUNTER SYMPTOMS
BLOOD IN STOOL: 0
ANAL BLEEDING: 0
RECTAL PAIN: 0
COLOR CHANGE: 0
ABDOMINAL PAIN: 0

## 2022-11-30 ASSESSMENT — VISUAL ACUITY: OU: 1

## 2022-11-30 NOTE — PROGRESS NOTES
Guerda Gil : 2000 Sex: female  Age: 25 y.o. Chief Complaint   Patient presents with    Medication Check     3 Month follow up       Assessment and Plan:  Isadora was seen today for medication check. Diagnoses and all orders for this visit:    Attention deficit disorder (ADD) without hyperactivity  -     Discontinue: amphetamine-dextroamphetamine (ADDERALL XR) 20 MG extended release capsule; Take 1 capsule by mouth every morning for 30 days. -     amphetamine-dextroamphetamine (ADDERALL XR) 15 MG extended release capsule; Take 1 capsule by mouth every morning for 30 days. Anxiety      Return in about 11 weeks (around 2/15/2023). Educational materials printed for patient's review and were included in patient instructions on her After Visit Summary and given to patient at the end of visit. Counseled regarding above diagnosis, including possible risks and complications,  especially if left uncontrolled. Counseled regarding the possible side effects, risks, benefits and alternatives to treatment; patient and/or guardian verbalizes understanding, agrees, feels comfortable with and wishes to proceed with above treatment plan. Advised patient to call with any new medication issues, and read all Rx info from pharmacy to assure aware of all possible risks and side effects of medication before taking. Reviewed age and gender appropriate health screening exams and vaccinations. Advised patient regarding importance of keeping up with recommended health maintenance and to schedule as soon as possible if overdue, as this is important in assessing for undiagnosed pathology, especially cancer, as well as protecting against potentially harmful/life threatening disease. Patient verbalizes understanding and agrees with above counseling, assessment and plan. All questions answered.     On 22 I have spent 30 reviewing previous notes, test results and face to face with the patient discussing the diagnosis and importance of compliance with the treatment plan as well as documenting on the day of the visit. Educational materials exercises printed for patient's review and were included in patient instructions on their After Visit Summary and given to patient at the end of visit. USPTF:      (Blood pressure 130/78) High Blood Pressure: Screening and Home Monitoring -- Adults  Grade: A (Recommended) recommends screening for high blood pressure in ages 25 years or older. obtain measurements outside of the clinical setting for diagnostic confirmation before starting treatment. Annual screening for adults aged 36 years or older or those who are at increased risk for blood pressure    (, TRI 67, LDL 95)  Lipid Disorders in Adults: Screening -- Men 28 and Older  Grade: A (Recommended) recommends screening men aged 28 and older for lipid disorders. (Social) Alcohol Misuse: Screening and Behavioral Counseling Interventions in Primary Care -- Adults  Grade: B (Recommended) recommends that clinicians screen adults aged 25 years or older for alcohol misuse and provide persons engaged in risky or hazardous drinking with brief behavioral counseling interventions to reduce alcohol misuse. (  ) Abnormal Blood Glucose and Type 2 Diabetes Mellitus: Screening -- Adults aged 36 to 79 years who are overweight or obese Grade: B (Recommended)    (BMI 30.21)  Obesity: Screening for and Management of-- All Adults  Grade: B(Recommended) recommends screening all adults for obesity. Clinicians should offer or refer patients with a body mass index (BMI) of 30 kg/m2 or higher to intensive, multicomponent behavioral interventions.          (Not a fall risk)  Fall Prevention -- Exercise/Physical Therapy: Community-dwelling Adults 72 Years or Older, Increased Risk for Falls   Grade: B (Recommended) recommends exercise or physical therapy to prevent falls in community-dwelling adults aged 72 years or older who are at increased risk for falls. (No new symptoms noted or reported today)  Depression: Screening -- General adult population, including pregnant and postpartum women  Grade: B(Recommended) recommends screening for depression in the general adult population,  Screening should be implemented with adequate systems in place to ensure accurate diagnosis, effective treatment, and appropriate follow-up. (  ) Thyroid Dysfunction: Screening --      (2020) Cervical Cancer: Screening -- Women 21 to 72 (Pap Smear) or 30-65 (in combo with HPV testing)  Grade: A(Recommended)    (  ) Breast Cancer: Screening with Mammography-- Women aged 48 to 76 years  Grade: B (Recommended) recommends biennial screening mammography for women aged 48 to 76 years. Joao Roass is doing really well on the 10 mg but is describing that its weaning off which were really she is in the middle of graduating and finding a job and needs to be on point for a longer period of time she wants to increase the dose we talked about possibly doing 10 mg twice daily immediate release but due to the side effects we thought that maybe we should try 15 mg once a day and see how she does on that. She will text back and tell me if it was effective. Patient is here today to review medication and have his 3-month checkup on the Adderall. Reports no problems at this time dosage seems to be working well. Feels to be more productive and focusing is exponentially better. There has been no reports of chest pain, palpitations, nausea, vomiting, or uncontrolled weight loss. Denies any shortness of breath. Patient continues to do optimal on the medication which speaks to a good diagnosis and medication matching. Abdominal Pain  Pertinent negatives include no fever, headaches or hematuria. Other  Pertinent negatives include no abdominal pain, chest pain, fever or headaches.   She has  Review of Systems   Constitutional:  Negative for activity change and fever. HENT:  Negative for nosebleeds. Cardiovascular:  Negative for chest pain, palpitations and leg swelling. Gastrointestinal:  Negative for abdominal pain, anal bleeding, blood in stool and rectal pain. Genitourinary:  Negative for hematuria. Skin:  Negative for color change and pallor. Neurological:  Negative for light-headedness and headaches. Hematological:  Does not bruise/bleed easily. Current Outpatient Medications:     ISOtretinoin (ACCUTANE) 40 MG chemo capsule, , Disp: , Rfl:     triamcinolone (KENALOG) 0.1 % cream, , Disp: , Rfl:     ondansetron (ZOFRAN) 4 MG tablet, Take 1 tablet by mouth daily as needed for Nausea or Vomiting, Disp: 30 tablet, Rfl: 3    acetaminophen (TYLENOL) 500 MG tablet, Take 500 mg by mouth every 6 hours as needed for Pain, Disp: , Rfl:     Ascorbic Acid (VITAMIN C) 500 MG CHEW, Take by mouth daily, Disp: , Rfl:     amphetamine-dextroamphetamine (ADDERALL XR) 15 MG extended release capsule, Take 1 capsule by mouth every morning for 30 days. , Disp: 30 capsule, Rfl: 0  Allergies   Allergen Reactions    Morphine Nausea And Vomiting       No past medical history on file.   Past Surgical History:   Procedure Laterality Date    RHINOPLASTY  2015    SPINAL FUSION  03/17/2021     Family History   Problem Relation Age of Onset    Rheum Arthritis Mother     Rheum Arthritis Father     Rashes/Skin Problems Brother      Social History     Socioeconomic History    Marital status: Single     Spouse name: Not on file    Number of children: Not on file    Years of education: Not on file    Highest education level: Not on file   Occupational History    Not on file   Tobacco Use    Smoking status: Never    Smokeless tobacco: Never   Vaping Use    Vaping Use: Never used   Substance and Sexual Activity    Alcohol use: Never    Drug use: Never    Sexual activity: Not on file   Other Topics Concern    Not on file   Social History Narrative    ** Merged History Encounter **          Social Determinants of Health     Financial Resource Strain: Low Risk     Difficulty of Paying Living Expenses: Not hard at all   Food Insecurity: No Food Insecurity    Worried About Running Out of Food in the Last Year: Never true    Ran Out of Food in the Last Year: Never true   Transportation Needs: Not on file   Physical Activity: Not on file   Stress: Not on file   Social Connections: Not on file   Intimate Partner Violence: Not on file   Housing Stability: Not on file       Vitals:    11/30/22 0842   BP: 110/68   Site: Left Upper Arm   Position: Sitting   Cuff Size: Large Adult   Pulse: 75   Resp: 17   Temp: 98.4 °F (36.9 °C)   TempSrc: Temporal   SpO2: 99%   Weight: 178 lb 12.8 oz (81.1 kg)   Height: 5' 7.25\" (1.708 m)       Physical Exam  Constitutional:       Appearance: Normal appearance. She is normal weight. HENT:      Head: Normocephalic and atraumatic. Right Ear: Tympanic membrane and ear canal normal. No decreased hearing noted. No drainage or tenderness. No middle ear effusion. There is no impacted cerumen. No foreign body. Tympanic membrane is not injected, scarred, perforated or erythematous. Left Ear: Tympanic membrane normal. No decreased hearing noted. No drainage or tenderness. No middle ear effusion. There is no impacted cerumen. No foreign body. Tympanic membrane is not injected, scarred, perforated or erythematous. Nose: No congestion or rhinorrhea. Right Sinus: No maxillary sinus tenderness or frontal sinus tenderness. Left Sinus: No maxillary sinus tenderness or frontal sinus tenderness. Mouth/Throat:      Mouth: Mucous membranes are dry. Pharynx: Oropharynx is clear. No oropharyngeal exudate or posterior oropharyngeal erythema. Eyes:      General: Lids are normal. Vision grossly intact. No allergic shiner. Right eye: No discharge. Left eye: No discharge. Extraocular Movements: Extraocular movements intact.       Right eye: No nystagmus. Left eye: No nystagmus. Conjunctiva/sclera: Conjunctivae normal.      Right eye: Right conjunctiva is not injected. No exudate or hemorrhage. Left eye: Left conjunctiva is not injected. No exudate or hemorrhage. Pupils: Pupils are equal, round, and reactive to light. Neck:      Thyroid: No thyromegaly. Meningeal: Brudzinski's sign and Kernig's sign absent. Cardiovascular:      Rate and Rhythm: Normal rate and regular rhythm. Heart sounds: Normal heart sounds. Pulmonary:      Effort: No retractions. Breath sounds: No decreased breath sounds, wheezing, rhonchi or rales. Abdominal:      Tenderness: There is no abdominal tenderness. Musculoskeletal:         General: Normal range of motion. Cervical back: Normal range of motion. No rigidity. No muscular tenderness. Lymphadenopathy:      Cervical: No cervical adenopathy. Right cervical: No superficial, deep or posterior cervical adenopathy. Left cervical: No superficial, deep or posterior cervical adenopathy. Skin:     General: Skin is warm. Neurological:      Mental Status: She is alert.             Seen By:  DANNY Gandhi - JOSE ANTONIO

## 2023-02-15 ENCOUNTER — OFFICE VISIT (OUTPATIENT)
Dept: FAMILY MEDICINE CLINIC | Age: 23
End: 2023-02-15
Payer: COMMERCIAL

## 2023-02-15 VITALS
OXYGEN SATURATION: 97 % | HEIGHT: 67 IN | SYSTOLIC BLOOD PRESSURE: 102 MMHG | RESPIRATION RATE: 17 BRPM | BODY MASS INDEX: 26.98 KG/M2 | DIASTOLIC BLOOD PRESSURE: 70 MMHG | TEMPERATURE: 98.7 F | WEIGHT: 171.9 LBS | HEART RATE: 91 BPM

## 2023-02-15 DIAGNOSIS — Z77.098 EXPOSURE TO CHEMICAL INHALATION: Primary | ICD-10-CM

## 2023-02-15 DIAGNOSIS — Z00.00 GENERAL MEDICAL EXAMINATION: ICD-10-CM

## 2023-02-15 DIAGNOSIS — F98.8 ATTENTION DEFICIT DISORDER (ADD) WITHOUT HYPERACTIVITY: ICD-10-CM

## 2023-02-15 DIAGNOSIS — R21 RASH: ICD-10-CM

## 2023-02-15 DIAGNOSIS — L29.9 PRURITUS: ICD-10-CM

## 2023-02-15 PROCEDURE — 99213 OFFICE O/P EST LOW 20 MIN: CPT | Performed by: NURSE PRACTITIONER

## 2023-02-15 RX ORDER — TRIAMCINOLONE ACETONIDE 1 MG/G
CREAM TOPICAL
Qty: 160 G | Refills: 1 | Status: SHIPPED | OUTPATIENT
Start: 2023-02-15

## 2023-02-15 RX ORDER — DEXTROAMPHETAMINE SACCHARATE, AMPHETAMINE ASPARTATE MONOHYDRATE, DEXTROAMPHETAMINE SULFATE AND AMPHETAMINE SULFATE 3.75; 3.75; 3.75; 3.75 MG/1; MG/1; MG/1; MG/1
15 CAPSULE, EXTENDED RELEASE ORAL EVERY MORNING
Qty: 30 CAPSULE | Refills: 0 | Status: SHIPPED | OUTPATIENT
Start: 2023-02-15 | End: 2023-03-17

## 2023-02-15 RX ORDER — DEXTROAMPHETAMINE SACCHARATE, AMPHETAMINE ASPARTATE MONOHYDRATE, DEXTROAMPHETAMINE SULFATE AND AMPHETAMINE SULFATE 5; 5; 5; 5 MG/1; MG/1; MG/1; MG/1
CAPSULE, EXTENDED RELEASE ORAL
COMMUNITY
Start: 2023-01-11 | End: 2023-02-15

## 2023-02-15 SDOH — ECONOMIC STABILITY: FOOD INSECURITY: WITHIN THE PAST 12 MONTHS, THE FOOD YOU BOUGHT JUST DIDN'T LAST AND YOU DIDN'T HAVE MONEY TO GET MORE.: NEVER TRUE

## 2023-02-15 SDOH — ECONOMIC STABILITY: HOUSING INSECURITY
IN THE LAST 12 MONTHS, WAS THERE A TIME WHEN YOU DID NOT HAVE A STEADY PLACE TO SLEEP OR SLEPT IN A SHELTER (INCLUDING NOW)?: NO

## 2023-02-15 SDOH — ECONOMIC STABILITY: INCOME INSECURITY: HOW HARD IS IT FOR YOU TO PAY FOR THE VERY BASICS LIKE FOOD, HOUSING, MEDICAL CARE, AND HEATING?: NOT HARD AT ALL

## 2023-02-15 SDOH — ECONOMIC STABILITY: TRANSPORTATION INSECURITY
IN THE PAST 12 MONTHS, HAS LACK OF TRANSPORTATION KEPT YOU FROM MEETINGS, WORK, OR FROM GETTING THINGS NEEDED FOR DAILY LIVING?: NO

## 2023-02-15 SDOH — ECONOMIC STABILITY: FOOD INSECURITY: WITHIN THE PAST 12 MONTHS, YOU WORRIED THAT YOUR FOOD WOULD RUN OUT BEFORE YOU GOT MONEY TO BUY MORE.: NEVER TRUE

## 2023-02-15 SDOH — ECONOMIC STABILITY: INCOME INSECURITY: HOW HARD IS IT FOR YOU TO PAY FOR THE VERY BASICS LIKE FOOD, HOUSING, MEDICAL CARE, AND HEATING?: PATIENT DECLINED

## 2023-02-15 ASSESSMENT — PATIENT HEALTH QUESTIONNAIRE - PHQ9
4. FEELING TIRED OR HAVING LITTLE ENERGY: 0
SUM OF ALL RESPONSES TO PHQ9 QUESTIONS 1 & 2: 0
SUM OF ALL RESPONSES TO PHQ QUESTIONS 1-9: 0
1. LITTLE INTEREST OR PLEASURE IN DOING THINGS: 0
3. TROUBLE FALLING OR STAYING ASLEEP: 0
10. IF YOU CHECKED OFF ANY PROBLEMS, HOW DIFFICULT HAVE THESE PROBLEMS MADE IT FOR YOU TO DO YOUR WORK, TAKE CARE OF THINGS AT HOME, OR GET ALONG WITH OTHER PEOPLE: 0
9. THOUGHTS THAT YOU WOULD BE BETTER OFF DEAD, OR OF HURTING YOURSELF: 0
5. POOR APPETITE OR OVEREATING: 0
SUM OF ALL RESPONSES TO PHQ QUESTIONS 1-9: 0
2. FEELING DOWN, DEPRESSED OR HOPELESS: 0
SUM OF ALL RESPONSES TO PHQ QUESTIONS 1-9: 0
7. TROUBLE CONCENTRATING ON THINGS, SUCH AS READING THE NEWSPAPER OR WATCHING TELEVISION: 0
8. MOVING OR SPEAKING SO SLOWLY THAT OTHER PEOPLE COULD HAVE NOTICED. OR THE OPPOSITE, BEING SO FIGETY OR RESTLESS THAT YOU HAVE BEEN MOVING AROUND A LOT MORE THAN USUAL: 0
6. FEELING BAD ABOUT YOURSELF - OR THAT YOU ARE A FAILURE OR HAVE LET YOURSELF OR YOUR FAMILY DOWN: 0
SUM OF ALL RESPONSES TO PHQ QUESTIONS 1-9: 0

## 2023-02-15 ASSESSMENT — ENCOUNTER SYMPTOMS
ABDOMINAL PAIN: 0
ANAL BLEEDING: 0
COLOR CHANGE: 0
BLOOD IN STOOL: 0
RECTAL PAIN: 0

## 2023-02-15 ASSESSMENT — VISUAL ACUITY: OU: 1
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
## 2023-02-28 ENCOUNTER — OFFICE VISIT (OUTPATIENT)
Dept: FAMILY MEDICINE CLINIC | Age: 23
End: 2023-02-28
Payer: COMMERCIAL

## 2023-02-28 VITALS
HEIGHT: 68 IN | DIASTOLIC BLOOD PRESSURE: 80 MMHG | WEIGHT: 171 LBS | HEART RATE: 75 BPM | TEMPERATURE: 97 F | RESPIRATION RATE: 18 BRPM | OXYGEN SATURATION: 98 % | BODY MASS INDEX: 25.91 KG/M2 | SYSTOLIC BLOOD PRESSURE: 110 MMHG

## 2023-02-28 DIAGNOSIS — Z13.29 SCREENING FOR THYROID DISORDER: ICD-10-CM

## 2023-02-28 DIAGNOSIS — L29.9 PRURITUS: ICD-10-CM

## 2023-02-28 DIAGNOSIS — Z13.220 SCREENING FOR LIPOID DISORDERS: ICD-10-CM

## 2023-02-28 DIAGNOSIS — Z77.098 EXPOSURE TO CHEMICAL INHALATION: Primary | ICD-10-CM

## 2023-02-28 DIAGNOSIS — R21 RASH: ICD-10-CM

## 2023-02-28 DIAGNOSIS — Z77.098 EXPOSURE TO CHEMICAL INHALATION: ICD-10-CM

## 2023-02-28 LAB
ALBUMIN SERPL-MCNC: 4.3 G/DL (ref 3.5–5.2)
ALP BLD-CCNC: 62 U/L (ref 35–104)
ALT SERPL-CCNC: 16 U/L (ref 0–32)
ANION GAP SERPL CALCULATED.3IONS-SCNC: 9 MMOL/L (ref 7–16)
AST SERPL-CCNC: 24 U/L (ref 0–31)
BACTERIA: ABNORMAL /HPF
BASOPHILS ABSOLUTE: 0.04 E9/L (ref 0–0.2)
BASOPHILS RELATIVE PERCENT: 0.7 % (ref 0–2)
BILIRUB SERPL-MCNC: 0.5 MG/DL (ref 0–1.2)
BILIRUBIN URINE: NEGATIVE
BLOOD, URINE: NEGATIVE
BUN BLDV-MCNC: 10 MG/DL (ref 6–20)
C-REACTIVE PROTEIN: <0.3 MG/DL (ref 0–0.4)
CALCIUM SERPL-MCNC: 8.9 MG/DL (ref 8.6–10.2)
CHLORIDE BLD-SCNC: 106 MMOL/L (ref 98–107)
CLARITY: CLEAR
CO2: 22 MMOL/L (ref 22–29)
COLOR: YELLOW
CREAT SERPL-MCNC: 0.9 MG/DL (ref 0.5–1)
EOSINOPHILS ABSOLUTE: 0.06 E9/L (ref 0.05–0.5)
EOSINOPHILS RELATIVE PERCENT: 1.1 % (ref 0–6)
GFR SERPL CREATININE-BSD FRML MDRD: >60 ML/MIN/1.73
GLUCOSE BLD-MCNC: 91 MG/DL (ref 74–99)
GLUCOSE URINE: NEGATIVE MG/DL
HCT VFR BLD CALC: 40.4 % (ref 34–48)
HEMOGLOBIN: 13.7 G/DL (ref 11.5–15.5)
IMMATURE GRANULOCYTES #: 0 E9/L
IMMATURE GRANULOCYTES %: 0 % (ref 0–5)
KETONES, URINE: NEGATIVE MG/DL
LEUKOCYTE ESTERASE, URINE: ABNORMAL
LYMPHOCYTES ABSOLUTE: 2.07 E9/L (ref 1.5–4)
LYMPHOCYTES RELATIVE PERCENT: 37.4 % (ref 20–42)
MCH RBC QN AUTO: 28.7 PG (ref 26–35)
MCHC RBC AUTO-ENTMCNC: 33.9 % (ref 32–34.5)
MCV RBC AUTO: 84.7 FL (ref 80–99.9)
MONOCYTES ABSOLUTE: 0.31 E9/L (ref 0.1–0.95)
MONOCYTES RELATIVE PERCENT: 5.6 % (ref 2–12)
MUCUS: PRESENT /LPF
NEUTROPHILS ABSOLUTE: 3.06 E9/L (ref 1.8–7.3)
NEUTROPHILS RELATIVE PERCENT: 55.2 % (ref 43–80)
NITRITE, URINE: NEGATIVE
PDW BLD-RTO: 13.2 FL (ref 11.5–15)
PH UA: 6 (ref 5–9)
PLATELET # BLD: 178 E9/L (ref 130–450)
PMV BLD AUTO: 12.8 FL (ref 7–12)
POTASSIUM SERPL-SCNC: 4.2 MMOL/L (ref 3.5–5)
PROTEIN UA: NEGATIVE MG/DL
RBC # BLD: 4.77 E12/L (ref 3.5–5.5)
RBC UA: ABNORMAL /HPF (ref 0–2)
SODIUM BLD-SCNC: 137 MMOL/L (ref 132–146)
SPECIFIC GRAVITY UA: >=1.03 (ref 1–1.03)
TOTAL PROTEIN: 7.3 G/DL (ref 6.4–8.3)
TSH SERPL DL<=0.05 MIU/L-ACNC: 0.85 UIU/ML (ref 0.27–4.2)
UROBILINOGEN, URINE: 0.2 E.U./DL
WBC # BLD: 5.5 E9/L (ref 4.5–11.5)
WBC UA: ABNORMAL /HPF (ref 0–5)

## 2023-02-28 PROCEDURE — 99213 OFFICE O/P EST LOW 20 MIN: CPT | Performed by: NURSE PRACTITIONER

## 2023-02-28 ASSESSMENT — ENCOUNTER SYMPTOMS
BLOOD IN STOOL: 0
COLOR CHANGE: 0
ANAL BLEEDING: 0
RECTAL PAIN: 0
ABDOMINAL PAIN: 0

## 2023-02-28 ASSESSMENT — VISUAL ACUITY: OU: 1
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
## 2023-03-01 LAB — SEDIMENTATION RATE, ERYTHROCYTE: 5 MM/HR (ref 0–20)

## 2023-04-18 ENCOUNTER — OFFICE VISIT (OUTPATIENT)
Dept: FAMILY MEDICINE CLINIC | Age: 23
End: 2023-04-18
Payer: COMMERCIAL

## 2023-04-18 VITALS
OXYGEN SATURATION: 99 % | TEMPERATURE: 98.8 F | RESPIRATION RATE: 18 BRPM | SYSTOLIC BLOOD PRESSURE: 110 MMHG | HEIGHT: 68 IN | HEART RATE: 100 BPM | BODY MASS INDEX: 25.46 KG/M2 | WEIGHT: 168 LBS | DIASTOLIC BLOOD PRESSURE: 85 MMHG

## 2023-04-18 DIAGNOSIS — R06.02 SOB (SHORTNESS OF BREATH): ICD-10-CM

## 2023-04-18 DIAGNOSIS — R09.89 RUNNY NOSE: ICD-10-CM

## 2023-04-18 DIAGNOSIS — J02.0 ACUTE STREPTOCOCCAL PHARYNGITIS: Primary | ICD-10-CM

## 2023-04-18 DIAGNOSIS — F98.8 ATTENTION DEFICIT DISORDER (ADD) WITHOUT HYPERACTIVITY: ICD-10-CM

## 2023-04-18 DIAGNOSIS — J02.9 SORE THROAT: ICD-10-CM

## 2023-04-18 LAB
INFLUENZA A ANTIGEN, POC: NEGATIVE
INFLUENZA B ANTIGEN, POC: NEGATIVE
LOT EXPIRE DATE: NORMAL
LOT KIT NUMBER: NORMAL
S PYO AG THROAT QL: POSITIVE
SARS-COV-2, POC: NORMAL
VALID INTERNAL CONTROL: NORMAL
VENDOR AND KIT NAME POC: NORMAL

## 2023-04-18 PROCEDURE — 87428 SARSCOV & INF VIR A&B AG IA: CPT | Performed by: NURSE PRACTITIONER

## 2023-04-18 PROCEDURE — 87880 STREP A ASSAY W/OPTIC: CPT | Performed by: NURSE PRACTITIONER

## 2023-04-18 PROCEDURE — 99213 OFFICE O/P EST LOW 20 MIN: CPT | Performed by: NURSE PRACTITIONER

## 2023-04-18 RX ORDER — DEXTROAMPHETAMINE SACCHARATE, AMPHETAMINE ASPARTATE MONOHYDRATE, DEXTROAMPHETAMINE SULFATE AND AMPHETAMINE SULFATE 3.75; 3.75; 3.75; 3.75 MG/1; MG/1; MG/1; MG/1
15 CAPSULE, EXTENDED RELEASE ORAL EVERY MORNING
Qty: 30 CAPSULE | Refills: 0 | Status: SHIPPED | OUTPATIENT
Start: 2023-04-18 | End: 2023-05-18

## 2023-04-18 RX ORDER — PREDNISONE 20 MG/1
20 TABLET ORAL 2 TIMES DAILY
Qty: 10 TABLET | Refills: 0 | Status: SHIPPED | OUTPATIENT
Start: 2023-04-18 | End: 2023-04-23

## 2023-04-18 RX ORDER — AMOXICILLIN 500 MG/1
500 CAPSULE ORAL 3 TIMES DAILY
COMMUNITY
Start: 2023-04-17

## 2023-04-18 NOTE — PROGRESS NOTES
days.    Runny nose  -     POCT rapid strep A  -     POCT COVID-19 & Influenza A/B    SOB (shortness of breath)  -     POCT rapid strep A  -     POCT COVID-19 & Influenza A/B    Sore throat  -     POCT rapid strep A  -     POCT COVID-19 & Influenza A/B    Other orders  -     predniSONE (DELTASONE) 20 MG tablet; Take 1 tablet by mouth 2 times daily for 5 days        Rapid strep came back   Recent Results (from the past 24 hour(s))   POCT rapid strep A    Collection Time: 23  2:41 PM   Result Value Ref Range    Strep A Ag Positive (A) None Detected   POCT COVID-19 & Influenza A/B    Collection Time: 23  2:41 PM   Result Value Ref Range    VALID INTERNAL CONTROL pass     Lot/Kit Number [id number]     Lot/Kit  date: 2024     SARS-COV-2, POC Not-Detected Not Detected    Influenza A Antigen, POC Negative Negative    Influenza B Antigen, POC Negative Negative    Vendor and kit name       [facility] COVID-19 Ag CARD - This test is an antigen test (this test has been authorized under Emergency Use Authorization)   ] Pt advised that strep illness is likely and should resolve with time and conservative measures. Script written for amox, side effects discussed. Increase fluids and rest. NSAIDs prn pain/fever. F/u PCP in 5-7 days if symptoms persist. ED sooner if symptoms worsen or change. ED immediately with the development of refractory fever, shaking chills, dyspnea, dysphagia, neck stiffness, vomiting, etc. Pt is in agreement with this care plan. All questions answered. Return in about 3 months (around 2023), or if symptoms worsen or fail to improve.     Eloise Gonzalez, APRN - CNP

## 2023-05-30 ENCOUNTER — OFFICE VISIT (OUTPATIENT)
Dept: FAMILY MEDICINE CLINIC | Age: 23
End: 2023-05-30
Payer: COMMERCIAL

## 2023-05-30 VITALS
WEIGHT: 172 LBS | HEIGHT: 68 IN | HEART RATE: 62 BPM | TEMPERATURE: 97.6 F | SYSTOLIC BLOOD PRESSURE: 120 MMHG | RESPIRATION RATE: 18 BRPM | BODY MASS INDEX: 26.07 KG/M2 | DIASTOLIC BLOOD PRESSURE: 70 MMHG | OXYGEN SATURATION: 99 %

## 2023-05-30 DIAGNOSIS — Z00.00 GENERAL MEDICAL EXAMINATION: Primary | ICD-10-CM

## 2023-05-30 DIAGNOSIS — F98.8 ATTENTION DEFICIT DISORDER (ADD) WITHOUT HYPERACTIVITY: ICD-10-CM

## 2023-05-30 DIAGNOSIS — Z79.899 LONG TERM USE OF DRUG: ICD-10-CM

## 2023-05-30 PROCEDURE — 99213 OFFICE O/P EST LOW 20 MIN: CPT | Performed by: NURSE PRACTITIONER

## 2023-05-30 RX ORDER — DEXTROAMPHETAMINE SACCHARATE, AMPHETAMINE ASPARTATE MONOHYDRATE, DEXTROAMPHETAMINE SULFATE AND AMPHETAMINE SULFATE 3.75; 3.75; 3.75; 3.75 MG/1; MG/1; MG/1; MG/1
15 CAPSULE, EXTENDED RELEASE ORAL EVERY MORNING
Qty: 30 CAPSULE | Refills: 0 | Status: SHIPPED | OUTPATIENT
Start: 2023-05-30 | End: 2023-06-29

## 2023-05-30 SDOH — ECONOMIC STABILITY: INCOME INSECURITY: HOW HARD IS IT FOR YOU TO PAY FOR THE VERY BASICS LIKE FOOD, HOUSING, MEDICAL CARE, AND HEATING?: NOT HARD AT ALL

## 2023-05-30 SDOH — ECONOMIC STABILITY: FOOD INSECURITY: WITHIN THE PAST 12 MONTHS, YOU WORRIED THAT YOUR FOOD WOULD RUN OUT BEFORE YOU GOT MONEY TO BUY MORE.: NEVER TRUE

## 2023-05-30 SDOH — ECONOMIC STABILITY: FOOD INSECURITY: WITHIN THE PAST 12 MONTHS, THE FOOD YOU BOUGHT JUST DIDN'T LAST AND YOU DIDN'T HAVE MONEY TO GET MORE.: NEVER TRUE

## 2023-05-30 ASSESSMENT — VISUAL ACUITY: OU: 1

## 2023-05-30 ASSESSMENT — PATIENT HEALTH QUESTIONNAIRE - PHQ9
8. MOVING OR SPEAKING SO SLOWLY THAT OTHER PEOPLE COULD HAVE NOTICED. OR THE OPPOSITE, BEING SO FIGETY OR RESTLESS THAT YOU HAVE BEEN MOVING AROUND A LOT MORE THAN USUAL: 0
SUM OF ALL RESPONSES TO PHQ QUESTIONS 1-9: 0
SUM OF ALL RESPONSES TO PHQ9 QUESTIONS 1 & 2: 0
9. THOUGHTS THAT YOU WOULD BE BETTER OFF DEAD, OR OF HURTING YOURSELF: 0
6. FEELING BAD ABOUT YOURSELF - OR THAT YOU ARE A FAILURE OR HAVE LET YOURSELF OR YOUR FAMILY DOWN: 0
10. IF YOU CHECKED OFF ANY PROBLEMS, HOW DIFFICULT HAVE THESE PROBLEMS MADE IT FOR YOU TO DO YOUR WORK, TAKE CARE OF THINGS AT HOME, OR GET ALONG WITH OTHER PEOPLE: 0
1. LITTLE INTEREST OR PLEASURE IN DOING THINGS: 0
4. FEELING TIRED OR HAVING LITTLE ENERGY: 0
SUM OF ALL RESPONSES TO PHQ QUESTIONS 1-9: 0
7. TROUBLE CONCENTRATING ON THINGS, SUCH AS READING THE NEWSPAPER OR WATCHING TELEVISION: 0
5. POOR APPETITE OR OVEREATING: 0
3. TROUBLE FALLING OR STAYING ASLEEP: 0
2. FEELING DOWN, DEPRESSED OR HOPELESS: 0

## 2023-05-30 ASSESSMENT — ENCOUNTER SYMPTOMS
ANAL BLEEDING: 0
ABDOMINAL PAIN: 0
COLOR CHANGE: 0
BLOOD IN STOOL: 0
RECTAL PAIN: 0

## 2023-05-30 NOTE — PROGRESS NOTES
Annemarie Whitman : 2000 Sex: female  Age: 21 y.o. Chief Complaint   Patient presents with    Medication Check     3 month med check today        Assessment and Plan:  Isadora was seen today for medication check. Diagnoses and all orders for this visit:    General medical examination    Attention deficit disorder (ADD) without hyperactivity    Long term use of drug        Return if symptoms worsen or fail to improve. Educational materials printed for patient's review and were included in patient instructions on her After Visit Summary and given to patient at the end of visit. Counseled regarding above diagnosis, including possible risks and complications,  especially if left uncontrolled. Counseled regarding the possible side effects, risks, benefits and alternatives to treatment; patient and/or guardian verbalizes understanding, agrees, feels comfortable with and wishes to proceed with above treatment plan. Advised patient to call with any new medication issues, and read all Rx info from pharmacy to assure aware of all possible risks and side effects of medication before taking. Reviewed age and gender appropriate health screening exams and vaccinations. Advised patient regarding importance of keeping up with recommended health maintenance and to schedule as soon as possible if overdue, as this is important in assessing for undiagnosed pathology, especially cancer, as well as protecting against potentially harmful/life threatening disease. Patient verbalizes understanding and agrees with above counseling, assessment and plan. All questions answered. On 23 I have spent 30 reviewing previous notes, test results and face to face with the patient discussing the diagnosis and importance of compliance with the treatment plan as well as documenting on the day of the visit.   Educational materials exercises printed for patient's review and were included in patient

## 2023-08-22 ENCOUNTER — OFFICE VISIT (OUTPATIENT)
Dept: FAMILY MEDICINE CLINIC | Age: 23
End: 2023-08-22
Payer: COMMERCIAL

## 2023-08-22 VITALS
BODY MASS INDEX: 25.76 KG/M2 | TEMPERATURE: 97.8 F | HEIGHT: 68 IN | WEIGHT: 170 LBS | OXYGEN SATURATION: 97 % | HEART RATE: 72 BPM | SYSTOLIC BLOOD PRESSURE: 110 MMHG | RESPIRATION RATE: 19 BRPM | DIASTOLIC BLOOD PRESSURE: 70 MMHG

## 2023-08-22 DIAGNOSIS — F98.8 ATTENTION DEFICIT DISORDER (ADD) WITHOUT HYPERACTIVITY: ICD-10-CM

## 2023-08-22 DIAGNOSIS — Z00.00 GENERAL MEDICAL EXAMINATION: Primary | ICD-10-CM

## 2023-08-22 DIAGNOSIS — Z79.899 LONG TERM USE OF DRUG: ICD-10-CM

## 2023-08-22 PROCEDURE — 99213 OFFICE O/P EST LOW 20 MIN: CPT | Performed by: NURSE PRACTITIONER

## 2023-08-22 RX ORDER — DEXTROAMPHETAMINE SACCHARATE, AMPHETAMINE ASPARTATE MONOHYDRATE, DEXTROAMPHETAMINE SULFATE AND AMPHETAMINE SULFATE 5; 5; 5; 5 MG/1; MG/1; MG/1; MG/1
1 CAPSULE, EXTENDED RELEASE ORAL EVERY MORNING
Qty: 30 CAPSULE | Refills: 0 | Status: SHIPPED | OUTPATIENT
Start: 2023-08-22 | End: 2023-09-21

## 2023-08-22 RX ORDER — DEXTROAMPHETAMINE SACCHARATE, AMPHETAMINE ASPARTATE MONOHYDRATE, DEXTROAMPHETAMINE SULFATE AND AMPHETAMINE SULFATE 3.75; 3.75; 3.75; 3.75 MG/1; MG/1; MG/1; MG/1
15 CAPSULE, EXTENDED RELEASE ORAL EVERY MORNING
Qty: 30 CAPSULE | Refills: 0 | Status: CANCELLED | OUTPATIENT
Start: 2023-08-22 | End: 2023-09-21

## 2023-08-22 SDOH — ECONOMIC STABILITY: INCOME INSECURITY: HOW HARD IS IT FOR YOU TO PAY FOR THE VERY BASICS LIKE FOOD, HOUSING, MEDICAL CARE, AND HEATING?: NOT HARD AT ALL

## 2023-08-22 SDOH — ECONOMIC STABILITY: FOOD INSECURITY: WITHIN THE PAST 12 MONTHS, YOU WORRIED THAT YOUR FOOD WOULD RUN OUT BEFORE YOU GOT MONEY TO BUY MORE.: NEVER TRUE

## 2023-08-22 SDOH — ECONOMIC STABILITY: FOOD INSECURITY: WITHIN THE PAST 12 MONTHS, THE FOOD YOU BOUGHT JUST DIDN'T LAST AND YOU DIDN'T HAVE MONEY TO GET MORE.: NEVER TRUE

## 2023-08-22 ASSESSMENT — PATIENT HEALTH QUESTIONNAIRE - PHQ9
SUM OF ALL RESPONSES TO PHQ QUESTIONS 1-9: 0
3. TROUBLE FALLING OR STAYING ASLEEP: 0
5. POOR APPETITE OR OVEREATING: 0
10. IF YOU CHECKED OFF ANY PROBLEMS, HOW DIFFICULT HAVE THESE PROBLEMS MADE IT FOR YOU TO DO YOUR WORK, TAKE CARE OF THINGS AT HOME, OR GET ALONG WITH OTHER PEOPLE: 0
9. THOUGHTS THAT YOU WOULD BE BETTER OFF DEAD, OR OF HURTING YOURSELF: 0
6. FEELING BAD ABOUT YOURSELF - OR THAT YOU ARE A FAILURE OR HAVE LET YOURSELF OR YOUR FAMILY DOWN: 0
SUM OF ALL RESPONSES TO PHQ QUESTIONS 1-9: 0
7. TROUBLE CONCENTRATING ON THINGS, SUCH AS READING THE NEWSPAPER OR WATCHING TELEVISION: 0
1. LITTLE INTEREST OR PLEASURE IN DOING THINGS: 0
SUM OF ALL RESPONSES TO PHQ QUESTIONS 1-9: 0
4. FEELING TIRED OR HAVING LITTLE ENERGY: 0
2. FEELING DOWN, DEPRESSED OR HOPELESS: 0
8. MOVING OR SPEAKING SO SLOWLY THAT OTHER PEOPLE COULD HAVE NOTICED. OR THE OPPOSITE, BEING SO FIGETY OR RESTLESS THAT YOU HAVE BEEN MOVING AROUND A LOT MORE THAN USUAL: 0
SUM OF ALL RESPONSES TO PHQ9 QUESTIONS 1 & 2: 0
SUM OF ALL RESPONSES TO PHQ QUESTIONS 1-9: 0

## 2023-08-22 ASSESSMENT — ENCOUNTER SYMPTOMS
ANAL BLEEDING: 0
ABDOMINAL PAIN: 0
RECTAL PAIN: 0
COLOR CHANGE: 0
BLOOD IN STOOL: 0

## 2023-08-22 ASSESSMENT — VISUAL ACUITY: OU: 1

## 2023-10-27 ENCOUNTER — OFFICE VISIT (OUTPATIENT)
Dept: FAMILY MEDICINE CLINIC | Age: 23
End: 2023-10-27
Payer: COMMERCIAL

## 2023-10-27 VITALS
WEIGHT: 174 LBS | SYSTOLIC BLOOD PRESSURE: 122 MMHG | TEMPERATURE: 98 F | BODY MASS INDEX: 26.37 KG/M2 | HEART RATE: 84 BPM | HEIGHT: 68 IN | RESPIRATION RATE: 17 BRPM | DIASTOLIC BLOOD PRESSURE: 62 MMHG | OXYGEN SATURATION: 98 %

## 2023-10-27 DIAGNOSIS — H66.003 NON-RECURRENT ACUTE SUPPURATIVE OTITIS MEDIA OF BOTH EARS WITHOUT SPONTANEOUS RUPTURE OF TYMPANIC MEMBRANES: Primary | ICD-10-CM

## 2023-10-27 DIAGNOSIS — R11.2 NAUSEA AND VOMITING, UNSPECIFIED VOMITING TYPE: ICD-10-CM

## 2023-10-27 DIAGNOSIS — R42 VERTIGO: ICD-10-CM

## 2023-10-27 PROCEDURE — 99213 OFFICE O/P EST LOW 20 MIN: CPT | Performed by: NURSE PRACTITIONER

## 2023-10-27 RX ORDER — MECLIZINE HYDROCHLORIDE 25 MG/1
25 TABLET ORAL 3 TIMES DAILY PRN
Qty: 30 TABLET | Refills: 0 | Status: SHIPPED | OUTPATIENT
Start: 2023-10-27 | End: 2023-11-06

## 2023-10-27 RX ORDER — AMOXICILLIN AND CLAVULANATE POTASSIUM 875; 125 MG/1; MG/1
1 TABLET, FILM COATED ORAL 2 TIMES DAILY
Qty: 20 TABLET | Refills: 0 | Status: SHIPPED | OUTPATIENT
Start: 2023-10-27 | End: 2023-11-06

## 2023-10-27 RX ORDER — METHYLPREDNISOLONE 4 MG/1
TABLET ORAL
Qty: 1 KIT | Refills: 0 | Status: SHIPPED | OUTPATIENT
Start: 2023-10-27 | End: 2023-11-02

## 2023-10-27 ASSESSMENT — ENCOUNTER SYMPTOMS
EYE PAIN: 0
APNEA: 0
COLOR CHANGE: 0
ANAL BLEEDING: 0
WHEEZING: 0
EYE ITCHING: 0
PHOTOPHOBIA: 0
RHINORRHEA: 0
STRIDOR: 0
FACIAL SWELLING: 0
COUGH: 0
SHORTNESS OF BREATH: 0
SINUS PAIN: 0
RECTAL PAIN: 0
VOMITING: 1
NAUSEA: 1
SORE THROAT: 0
BLOOD IN STOOL: 0
CHOKING: 0
DIARRHEA: 0
EYE DISCHARGE: 0
ABDOMINAL DISTENTION: 0
SINUS PRESSURE: 0
BACK PAIN: 0
TROUBLE SWALLOWING: 0
ABDOMINAL PAIN: 0
VOICE CHANGE: 0
EYE REDNESS: 0
CHEST TIGHTNESS: 0
CONSTIPATION: 0

## 2023-10-27 NOTE — PROGRESS NOTES
10/27/23  Araceli Purcell : 2000 Sex: female  Age: 21 y.o. Chief Complaint   Patient presents with    Dizziness       Patient states she got up this morning and had dizziness and then proceeded to have nausea and vomiting. She denies any nasal congestion, earache, sore throat, chest congestion although it does feel somewhat tight denies shortness of breath. She states that she has been feeling hot and then cold back-and-forth all morning but has not taken her temperature. Review of Systems   Constitutional:  Negative for activity change, appetite change, chills, diaphoresis, fatigue, fever and unexpected weight change. HENT:  Negative for congestion, dental problem, drooling, ear discharge, ear pain, facial swelling, hearing loss, mouth sores, nosebleeds, postnasal drip, rhinorrhea, sinus pressure, sinus pain, sneezing, sore throat, tinnitus, trouble swallowing and voice change. Eyes:  Negative for photophobia, pain, discharge, redness, itching and visual disturbance. Respiratory:  Negative for apnea, cough, choking, chest tightness, shortness of breath, wheezing and stridor. Cardiovascular:  Negative for chest pain, palpitations and leg swelling. Gastrointestinal:  Positive for nausea and vomiting. Negative for abdominal distention, abdominal pain, anal bleeding, blood in stool, constipation, diarrhea and rectal pain. Endocrine: Positive for cold intolerance and heat intolerance. Negative for polydipsia, polyphagia and polyuria. Genitourinary:  Negative for decreased urine volume, difficulty urinating, dysuria, enuresis, flank pain, frequency, genital sores, hematuria and urgency. Musculoskeletal:  Negative for arthralgias, back pain, gait problem, joint swelling, myalgias, neck pain and neck stiffness. Skin:  Negative for color change, pallor, rash and wound. Allergic/Immunologic: Negative for environmental allergies, food allergies and immunocompromised state.

## 2023-11-15 DIAGNOSIS — F98.8 ATTENTION DEFICIT DISORDER (ADD) WITHOUT HYPERACTIVITY: ICD-10-CM

## 2023-11-15 RX ORDER — DEXTROAMPHETAMINE SACCHARATE, AMPHETAMINE ASPARTATE MONOHYDRATE, DEXTROAMPHETAMINE SULFATE AND AMPHETAMINE SULFATE 5; 5; 5; 5 MG/1; MG/1; MG/1; MG/1
20 CAPSULE, EXTENDED RELEASE ORAL DAILY
Qty: 30 CAPSULE | Refills: 0 | Status: SHIPPED | OUTPATIENT
Start: 2023-11-15 | End: 2023-12-15

## 2023-11-15 RX ORDER — DEXTROAMPHETAMINE SACCHARATE, AMPHETAMINE ASPARTATE MONOHYDRATE, DEXTROAMPHETAMINE SULFATE AND AMPHETAMINE SULFATE 5; 5; 5; 5 MG/1; MG/1; MG/1; MG/1
1 CAPSULE, EXTENDED RELEASE ORAL EVERY MORNING
Qty: 30 CAPSULE | Refills: 0 | Status: CANCELLED | OUTPATIENT
Start: 2023-11-15 | End: 2023-12-15

## 2023-11-15 RX ORDER — DEXTROAMPHETAMINE SACCHARATE, AMPHETAMINE ASPARTATE MONOHYDRATE, DEXTROAMPHETAMINE SULFATE AND AMPHETAMINE SULFATE 5; 5; 5; 5 MG/1; MG/1; MG/1; MG/1
20 CAPSULE, EXTENDED RELEASE ORAL DAILY
Qty: 30 CAPSULE | Refills: 0 | Status: SHIPPED | OUTPATIENT
Start: 2023-12-15 | End: 2024-01-14

## 2023-11-15 RX ORDER — DEXTROAMPHETAMINE SACCHARATE, AMPHETAMINE ASPARTATE MONOHYDRATE, DEXTROAMPHETAMINE SULFATE AND AMPHETAMINE SULFATE 5; 5; 5; 5 MG/1; MG/1; MG/1; MG/1
20 CAPSULE, EXTENDED RELEASE ORAL DAILY
Qty: 30 CAPSULE | Refills: 0 | Status: SHIPPED | OUTPATIENT
Start: 2024-01-14 | End: 2024-02-13

## 2023-11-21 ENCOUNTER — OFFICE VISIT (OUTPATIENT)
Dept: FAMILY MEDICINE CLINIC | Age: 23
End: 2023-11-21
Payer: COMMERCIAL

## 2023-11-21 VITALS
OXYGEN SATURATION: 98 % | RESPIRATION RATE: 17 BRPM | TEMPERATURE: 98 F | BODY MASS INDEX: 27.28 KG/M2 | HEIGHT: 68 IN | DIASTOLIC BLOOD PRESSURE: 62 MMHG | HEART RATE: 75 BPM | WEIGHT: 180 LBS | SYSTOLIC BLOOD PRESSURE: 100 MMHG

## 2023-11-21 DIAGNOSIS — Z00.00 GENERAL MEDICAL EXAMINATION: Primary | ICD-10-CM

## 2023-11-21 DIAGNOSIS — Z79.899 LONG TERM USE OF DRUG: ICD-10-CM

## 2023-11-21 DIAGNOSIS — F98.8 ATTENTION DEFICIT DISORDER (ADD) WITHOUT HYPERACTIVITY: ICD-10-CM

## 2023-11-21 PROBLEM — M41.124 ADOLESCENT IDIOPATHIC SCOLIOSIS, THORACIC REGION: Status: ACTIVE | Noted: 2021-03-17

## 2023-11-21 PROCEDURE — 99213 OFFICE O/P EST LOW 20 MIN: CPT | Performed by: NURSE PRACTITIONER

## 2023-11-21 ASSESSMENT — ENCOUNTER SYMPTOMS
VOMITING: 0
COUGH: 0
WHEEZING: 0
BACK PAIN: 0
TROUBLE SWALLOWING: 0
NAUSEA: 0
CHEST TIGHTNESS: 0
SHORTNESS OF BREATH: 0
DIARRHEA: 0
ABDOMINAL PAIN: 0
VOICE CHANGE: 0
BLOOD IN STOOL: 0
CONSTIPATION: 0

## 2023-11-21 NOTE — PROGRESS NOTES
plan as well as documenting on the day of the visit. Educational materials exercises printed for patient's review and were included in patient instructions on their After Visit Summary and given to patient at the end of visit. No follow-ups on file. USPTF     No results found for: \"LABA1C\"  No results found for: \"EAG\"   Abnormal Blood Glucose and Type 2 Diabetes Mellitus: Screening -- Adults aged 36 to 79 years who are overweight or obese Grade: B (Recommended)    BP Readings from Last 3 Encounters:   11/21/23 100/62   10/27/23 122/62   08/22/23 110/70     High Blood Pressure: Screening and Home Monitoring -- Adults  Grade: A (Recommended) recommends screening for high blood pressure in ages 25 years or older. obtain measurements outside of the clinical setting for diagnostic confirmation before starting treatment. Annual screening for adults aged 36 years or older or those who are at increased risk for blood pressure    (  ) Colorectal Cancer: Screening --Adults aged 48 to 76 years  Grade: A (Recommended) recommends screening for colorectal cancer starting at age 48 years and continuing until age 76 years. Lab Results   Component Value Date    CHOL 171 05/26/2022     Lab Results   Component Value Date    TRIG 67 05/26/2022     Lab Results   Component Value Date    HDL 63 05/26/2022     Lab Results   Component Value Date    LDLCALC 95 05/26/2022     Lab Results   Component Value Date    LABVLDL 13 05/26/2022     No results found for: \"CHOLHDLRATIO\"   (  )  Lipid Disorders in Adults: Screening -- Men 28 and Older  Grade: A (Recommended) recommends screening men aged 28 and older for lipid disorders.      Alcohol Use: Not At Risk (5/6/2021)    AUDIT-C     Frequency of Alcohol Consumption: Never     Average Number of Drinks: Not asked     Frequency of Binge Drinking: Never      (  ) Alcohol Misuse: Screening and Behavioral Counseling Interventions in Primary Care -- Adults  Grade: B (Recommended)

## 2024-01-30 ENCOUNTER — OFFICE VISIT (OUTPATIENT)
Dept: FAMILY MEDICINE CLINIC | Age: 24
End: 2024-01-30
Payer: COMMERCIAL

## 2024-01-30 VITALS
OXYGEN SATURATION: 99 % | RESPIRATION RATE: 17 BRPM | WEIGHT: 194 LBS | SYSTOLIC BLOOD PRESSURE: 110 MMHG | HEIGHT: 68 IN | HEART RATE: 97 BPM | TEMPERATURE: 98.2 F | DIASTOLIC BLOOD PRESSURE: 70 MMHG | BODY MASS INDEX: 29.4 KG/M2

## 2024-01-30 DIAGNOSIS — R05.9 COUGH, UNSPECIFIED TYPE: ICD-10-CM

## 2024-01-30 DIAGNOSIS — J02.9 SORE THROAT: ICD-10-CM

## 2024-01-30 DIAGNOSIS — R06.02 SOB (SHORTNESS OF BREATH): ICD-10-CM

## 2024-01-30 DIAGNOSIS — J01.10 ACUTE NON-RECURRENT FRONTAL SINUSITIS: Primary | ICD-10-CM

## 2024-01-30 LAB
INFLUENZA A ANTIGEN, POC: NEGATIVE
INFLUENZA B ANTIGEN, POC: NEGATIVE
Lab: NORMAL
PERFORMING INSTRUMENT: NORMAL
QC PASS/FAIL: NORMAL
S PYO AG THROAT QL: NORMAL
SARS-COV-2, POC: NORMAL

## 2024-01-30 PROCEDURE — 87426 SARSCOV CORONAVIRUS AG IA: CPT | Performed by: NURSE PRACTITIONER

## 2024-01-30 PROCEDURE — 87880 STREP A ASSAY W/OPTIC: CPT | Performed by: NURSE PRACTITIONER

## 2024-01-30 PROCEDURE — 87804 INFLUENZA ASSAY W/OPTIC: CPT | Performed by: NURSE PRACTITIONER

## 2024-01-30 PROCEDURE — 99213 OFFICE O/P EST LOW 20 MIN: CPT | Performed by: NURSE PRACTITIONER

## 2024-01-30 RX ORDER — FLUCONAZOLE 150 MG/1
150 TABLET ORAL
Qty: 2 TABLET | Refills: 0 | Status: SHIPPED | OUTPATIENT
Start: 2024-01-30 | End: 2024-02-05

## 2024-01-30 RX ORDER — AMOXICILLIN AND CLAVULANATE POTASSIUM 875; 125 MG/1; MG/1
1 TABLET, FILM COATED ORAL 2 TIMES DAILY
Qty: 20 TABLET | Refills: 0 | Status: SHIPPED | OUTPATIENT
Start: 2024-01-30 | End: 2024-02-09

## 2024-01-30 RX ORDER — PREDNISONE 10 MG/1
10 TABLET ORAL 2 TIMES DAILY
Qty: 10 TABLET | Refills: 0 | Status: SHIPPED | OUTPATIENT
Start: 2024-01-30 | End: 2024-02-04

## 2024-01-30 RX ORDER — BENZONATATE 200 MG/1
200 CAPSULE ORAL 3 TIMES DAILY PRN
Qty: 30 CAPSULE | Refills: 0 | Status: SHIPPED | OUTPATIENT
Start: 2024-01-30 | End: 2024-02-06

## 2024-01-30 RX ORDER — CETIRIZINE HYDROCHLORIDE 10 MG/1
10 TABLET ORAL DAILY
Qty: 30 TABLET | Refills: 0 | Status: SHIPPED | OUTPATIENT
Start: 2024-01-30

## 2024-01-30 SDOH — ECONOMIC STABILITY: FOOD INSECURITY: WITHIN THE PAST 12 MONTHS, YOU WORRIED THAT YOUR FOOD WOULD RUN OUT BEFORE YOU GOT MONEY TO BUY MORE.: NEVER TRUE

## 2024-01-30 SDOH — ECONOMIC STABILITY: INCOME INSECURITY: HOW HARD IS IT FOR YOU TO PAY FOR THE VERY BASICS LIKE FOOD, HOUSING, MEDICAL CARE, AND HEATING?: NOT HARD AT ALL

## 2024-01-30 SDOH — ECONOMIC STABILITY: FOOD INSECURITY: WITHIN THE PAST 12 MONTHS, THE FOOD YOU BOUGHT JUST DIDN'T LAST AND YOU DIDN'T HAVE MONEY TO GET MORE.: NEVER TRUE

## 2024-01-30 ASSESSMENT — PATIENT HEALTH QUESTIONNAIRE - PHQ9
SUM OF ALL RESPONSES TO PHQ QUESTIONS 1-9: 0
6. FEELING BAD ABOUT YOURSELF - OR THAT YOU ARE A FAILURE OR HAVE LET YOURSELF OR YOUR FAMILY DOWN: 0
7. TROUBLE CONCENTRATING ON THINGS, SUCH AS READING THE NEWSPAPER OR WATCHING TELEVISION: 0
SUM OF ALL RESPONSES TO PHQ QUESTIONS 1-9: 0
5. POOR APPETITE OR OVEREATING: 0
9. THOUGHTS THAT YOU WOULD BE BETTER OFF DEAD, OR OF HURTING YOURSELF: 0
2. FEELING DOWN, DEPRESSED OR HOPELESS: 0
8. MOVING OR SPEAKING SO SLOWLY THAT OTHER PEOPLE COULD HAVE NOTICED. OR THE OPPOSITE, BEING SO FIGETY OR RESTLESS THAT YOU HAVE BEEN MOVING AROUND A LOT MORE THAN USUAL: 0
SUM OF ALL RESPONSES TO PHQ QUESTIONS 1-9: 0
SUM OF ALL RESPONSES TO PHQ QUESTIONS 1-9: 0
4. FEELING TIRED OR HAVING LITTLE ENERGY: 0
SUM OF ALL RESPONSES TO PHQ9 QUESTIONS 1 & 2: 0
10. IF YOU CHECKED OFF ANY PROBLEMS, HOW DIFFICULT HAVE THESE PROBLEMS MADE IT FOR YOU TO DO YOUR WORK, TAKE CARE OF THINGS AT HOME, OR GET ALONG WITH OTHER PEOPLE: 0
3. TROUBLE FALLING OR STAYING ASLEEP: 0
1. LITTLE INTEREST OR PLEASURE IN DOING THINGS: 0

## 2024-01-30 NOTE — PROGRESS NOTES
Chief Complaint:   Cough (Started Saturday been getting worse daily ,tried taking Muscniex dm not helping ), Shortness of Breath, and Sore Throat    History of Present Illness   Source of history provided by:  patient.     Isadora Bolivar is a 24 y.o. old female who presents to primary care for evaluation of sinus pressure, nasal congestion, discolored nasal drainage, bilateral ear pressure, mild  productive cough and sore throat x 4 days. Has been taking  Nothing  OTC without relief. Denies any fever, chills, wheezing, CP, SOB, or GI symptoms. denies any hx of asthma, COPD, or tobacco use.    Review of Systems   Unless otherwise stated in this report or unable to obtain because of the patient's clinical or mental status as evidenced by the medical record, this patients's positive and negative responses for Review of Systems, constitutional, psych, eyes, ENT, cardiovascular, respiratory, gastrointestinal, neurological, genitourinary, musculoskeletal, integument systems and systems related to the presenting problem are either stated in the preceding or were negative for the symptoms and/or complaints related to the medical problem.    Past Medical History:  has no past medical history on file.   Past Surgical History:  has a past surgical history that includes rhinoplasty (2015) and Spinal fusion (03/17/2021).  Social History:  reports that she has never smoked. She has never used smokeless tobacco. She reports that she does not drink alcohol and does not use drugs.  Family History: family history includes Rashes/Skin Problems in her brother; Rheum Arthritis in her father and mother.  Allergies: Morphine    Physical Exam   Vital Signs:  /70   Pulse 97   Temp 98.2 °F (36.8 °C) (Temporal)   Resp 17   Ht 1.727 m (5' 8\")   Wt 88 kg (194 lb)   LMP 01/30/2024 (Exact Date)   SpO2 99%   BMI 29.50 kg/m²    Oxygen Saturation Interpretation: Normal.    Constitutional:  Alert, development consistent with

## 2024-02-21 ENCOUNTER — OFFICE VISIT (OUTPATIENT)
Dept: FAMILY MEDICINE CLINIC | Age: 24
End: 2024-02-21
Payer: COMMERCIAL

## 2024-02-21 VITALS
TEMPERATURE: 97.6 F | WEIGHT: 189 LBS | RESPIRATION RATE: 18 BRPM | HEART RATE: 79 BPM | OXYGEN SATURATION: 97 % | SYSTOLIC BLOOD PRESSURE: 122 MMHG | HEIGHT: 68 IN | BODY MASS INDEX: 28.64 KG/M2 | DIASTOLIC BLOOD PRESSURE: 70 MMHG

## 2024-02-21 DIAGNOSIS — Z00.00 GENERAL MEDICAL EXAMINATION: ICD-10-CM

## 2024-02-21 DIAGNOSIS — F98.8 ATTENTION DEFICIT DISORDER (ADD) WITHOUT HYPERACTIVITY: Primary | ICD-10-CM

## 2024-02-21 DIAGNOSIS — Z79.899 LONG TERM USE OF DRUG: ICD-10-CM

## 2024-02-21 PROCEDURE — 99213 OFFICE O/P EST LOW 20 MIN: CPT | Performed by: NURSE PRACTITIONER

## 2024-02-21 RX ORDER — DEXTROAMPHETAMINE SACCHARATE, AMPHETAMINE ASPARTATE MONOHYDRATE, DEXTROAMPHETAMINE SULFATE AND AMPHETAMINE SULFATE 6.25; 6.25; 6.25; 6.25 MG/1; MG/1; MG/1; MG/1
25 CAPSULE, EXTENDED RELEASE ORAL DAILY
Qty: 30 CAPSULE | Refills: 0 | Status: SHIPPED | OUTPATIENT
Start: 2024-04-21 | End: 2024-05-21

## 2024-02-21 RX ORDER — DEXTROAMPHETAMINE SACCHARATE, AMPHETAMINE ASPARTATE MONOHYDRATE, DEXTROAMPHETAMINE SULFATE AND AMPHETAMINE SULFATE 5; 5; 5; 5 MG/1; MG/1; MG/1; MG/1
20 CAPSULE, EXTENDED RELEASE ORAL DAILY
Qty: 30 CAPSULE | Refills: 0 | Status: CANCELLED | OUTPATIENT
Start: 2024-04-21 | End: 2024-05-21

## 2024-02-21 RX ORDER — DEXTROAMPHETAMINE SACCHARATE, AMPHETAMINE ASPARTATE MONOHYDRATE, DEXTROAMPHETAMINE SULFATE AND AMPHETAMINE SULFATE 6.25; 6.25; 6.25; 6.25 MG/1; MG/1; MG/1; MG/1
25 CAPSULE, EXTENDED RELEASE ORAL DAILY
Qty: 30 CAPSULE | Refills: 0 | Status: SHIPPED | OUTPATIENT
Start: 2024-03-22 | End: 2024-04-21

## 2024-02-21 RX ORDER — DEXTROAMPHETAMINE SACCHARATE, AMPHETAMINE ASPARTATE MONOHYDRATE, DEXTROAMPHETAMINE SULFATE AND AMPHETAMINE SULFATE 5; 5; 5; 5 MG/1; MG/1; MG/1; MG/1
20 CAPSULE, EXTENDED RELEASE ORAL DAILY
Qty: 30 CAPSULE | Refills: 0 | Status: CANCELLED | OUTPATIENT
Start: 2024-03-22 | End: 2024-04-21

## 2024-02-21 RX ORDER — DEXTROAMPHETAMINE SACCHARATE, AMPHETAMINE ASPARTATE MONOHYDRATE, DEXTROAMPHETAMINE SULFATE AND AMPHETAMINE SULFATE 5; 5; 5; 5 MG/1; MG/1; MG/1; MG/1
20 CAPSULE, EXTENDED RELEASE ORAL DAILY
Qty: 30 CAPSULE | Refills: 0 | Status: CANCELLED | OUTPATIENT
Start: 2024-02-21 | End: 2024-03-22

## 2024-02-21 RX ORDER — DEXTROAMPHETAMINE SACCHARATE, AMPHETAMINE ASPARTATE MONOHYDRATE, DEXTROAMPHETAMINE SULFATE AND AMPHETAMINE SULFATE 6.25; 6.25; 6.25; 6.25 MG/1; MG/1; MG/1; MG/1
25 CAPSULE, EXTENDED RELEASE ORAL DAILY
Qty: 30 CAPSULE | Refills: 0 | Status: SHIPPED | OUTPATIENT
Start: 2024-02-21 | End: 2024-03-22

## 2024-02-21 ASSESSMENT — PATIENT HEALTH QUESTIONNAIRE - PHQ9
SUM OF ALL RESPONSES TO PHQ QUESTIONS 1-9: 1
2. FEELING DOWN, DEPRESSED OR HOPELESS: 0
4. FEELING TIRED OR HAVING LITTLE ENERGY: 0
10. IF YOU CHECKED OFF ANY PROBLEMS, HOW DIFFICULT HAVE THESE PROBLEMS MADE IT FOR YOU TO DO YOUR WORK, TAKE CARE OF THINGS AT HOME, OR GET ALONG WITH OTHER PEOPLE: 0
4. FEELING TIRED OR HAVING LITTLE ENERGY: NOT AT ALL
7. TROUBLE CONCENTRATING ON THINGS, SUCH AS READING THE NEWSPAPER OR WATCHING TELEVISION: 0
10. IF YOU CHECKED OFF ANY PROBLEMS, HOW DIFFICULT HAVE THESE PROBLEMS MADE IT FOR YOU TO DO YOUR WORK, TAKE CARE OF THINGS AT HOME, OR GET ALONG WITH OTHER PEOPLE: NOT DIFFICULT AT ALL
SUM OF ALL RESPONSES TO PHQ QUESTIONS 1-9: 1
3. TROUBLE FALLING OR STAYING ASLEEP: SEVERAL DAYS
SUM OF ALL RESPONSES TO PHQ QUESTIONS 1-9: 1
3. TROUBLE FALLING OR STAYING ASLEEP: 1
2. FEELING DOWN, DEPRESSED OR HOPELESS: NOT AT ALL
1. LITTLE INTEREST OR PLEASURE IN DOING THINGS: 0
8. MOVING OR SPEAKING SO SLOWLY THAT OTHER PEOPLE COULD HAVE NOTICED. OR THE OPPOSITE, BEING SO FIGETY OR RESTLESS THAT YOU HAVE BEEN MOVING AROUND A LOT MORE THAN USUAL: 0
5. POOR APPETITE OR OVEREATING: NOT AT ALL
SUM OF ALL RESPONSES TO PHQ9 QUESTIONS 1 & 2: 0
8. MOVING OR SPEAKING SO SLOWLY THAT OTHER PEOPLE COULD HAVE NOTICED. OR THE OPPOSITE - BEING SO FIDGETY OR RESTLESS THAT YOU HAVE BEEN MOVING AROUND A LOT MORE THAN USUAL: NOT AT ALL
9. THOUGHTS THAT YOU WOULD BE BETTER OFF DEAD, OR OF HURTING YOURSELF: 0
6. FEELING BAD ABOUT YOURSELF - OR THAT YOU ARE A FAILURE OR HAVE LET YOURSELF OR YOUR FAMILY DOWN: 0
6. FEELING BAD ABOUT YOURSELF - OR THAT YOU ARE A FAILURE OR HAVE LET YOURSELF OR YOUR FAMILY DOWN: NOT AT ALL
9. THOUGHTS THAT YOU WOULD BE BETTER OFF DEAD, OR OF HURTING YOURSELF: NOT AT ALL
SUM OF ALL RESPONSES TO PHQ QUESTIONS 1-9: 1
1. LITTLE INTEREST OR PLEASURE IN DOING THINGS: NOT AT ALL
SUM OF ALL RESPONSES TO PHQ QUESTIONS 1-9: 1
5. POOR APPETITE OR OVEREATING: 0
7. TROUBLE CONCENTRATING ON THINGS, SUCH AS READING THE NEWSPAPER OR WATCHING TELEVISION: NOT AT ALL

## 2024-02-21 ASSESSMENT — ENCOUNTER SYMPTOMS
COUGH: 0
DIARRHEA: 0
ABDOMINAL PAIN: 0
VOMITING: 0
BACK PAIN: 0
CHEST TIGHTNESS: 0
BLOOD IN STOOL: 0
SHORTNESS OF BREATH: 0
TROUBLE SWALLOWING: 0
NAUSEA: 0
VOICE CHANGE: 0
CONSTIPATION: 0
WHEEZING: 0

## 2024-02-21 NOTE — PROGRESS NOTES
Isadora Bolivar : 2000 Sex: female  Age: 24 y.o.    Chief Complaint   Patient presents with    Medication Check     3 month med check ,states it is to be upped today then will need a refill     Medication Refill       ASSESSMENT AND PLAN     Isadora was seen today for medication check and medication refill.    Diagnoses and all orders for this visit:    Attention deficit disorder (ADD) without hyperactivity  -     amphetamine-dextroamphetamine (ADDERALL XR) 25 MG extended release capsule; Take 1 capsule by mouth daily for 30 days. Max Daily Amount: 25 mg  -     amphetamine-dextroamphetamine (ADDERALL XR) 25 MG extended release capsule; Take 1 capsule by mouth daily for 30 days. Max Daily Amount: 25 mg  -     amphetamine-dextroamphetamine (ADDERALL XR) 25 MG extended release capsule; Take 1 capsule by mouth daily for 30 days. Max Daily Amount: 25 mg    General medical examination    Long term use of drug        Lab / Imaging Results   (All laboratory and radiology results have been personally reviewed by myself)  Labs:  No results found for this visit on 24.    Imaging:  All Radiology results interpreted by Radiologist unless otherwise noted.  No results found.    WAY FORWARD     Educational materials printed for patient's review and were included in patient instructions on her After Visit Summary and given to patient at the end of visit.       Counseled regarding above diagnosis, including possible risks and complications,  especially if left uncontrolled.     Counseled regarding the possible side effects, risks, benefits and alternatives to treatment; patient and/or guardian verbalizes understanding, agrees, feels comfortable with and wishes to proceed with above treatment plan.     Advised patient to call with any new medication issues, and read all Rx info from pharmacy to assure aware of all possible risks and side effects of medication before taking.     Reviewed age and gender appropriate health

## 2024-02-28 ENCOUNTER — OFFICE VISIT (OUTPATIENT)
Dept: FAMILY MEDICINE CLINIC | Age: 24
End: 2024-02-28
Payer: COMMERCIAL

## 2024-02-28 VITALS
RESPIRATION RATE: 18 BRPM | WEIGHT: 187 LBS | OXYGEN SATURATION: 97 % | TEMPERATURE: 98.2 F | SYSTOLIC BLOOD PRESSURE: 110 MMHG | HEART RATE: 99 BPM | DIASTOLIC BLOOD PRESSURE: 70 MMHG | HEIGHT: 68 IN | BODY MASS INDEX: 28.34 KG/M2

## 2024-02-28 DIAGNOSIS — J10.1 INFLUENZA B: Primary | ICD-10-CM

## 2024-02-28 DIAGNOSIS — R09.89 RUNNY NOSE: ICD-10-CM

## 2024-02-28 DIAGNOSIS — R11.0 NAUSEA: ICD-10-CM

## 2024-02-28 DIAGNOSIS — R05.9 COUGH, UNSPECIFIED TYPE: ICD-10-CM

## 2024-02-28 DIAGNOSIS — J02.9 SORE THROAT: ICD-10-CM

## 2024-02-28 DIAGNOSIS — R68.83 CHILLS: ICD-10-CM

## 2024-02-28 LAB
INFLUENZA A ANTIGEN, POC: NEGATIVE
INFLUENZA B ANTIGEN, POC: POSITIVE
Lab: NORMAL
PERFORMING INSTRUMENT: NORMAL
QC PASS/FAIL: NORMAL
S PYO AG THROAT QL: NORMAL
SARS-COV-2, POC: NORMAL

## 2024-02-28 PROCEDURE — 99213 OFFICE O/P EST LOW 20 MIN: CPT | Performed by: NURSE PRACTITIONER

## 2024-02-28 PROCEDURE — 87804 INFLUENZA ASSAY W/OPTIC: CPT | Performed by: NURSE PRACTITIONER

## 2024-02-28 PROCEDURE — 96372 THER/PROPH/DIAG INJ SC/IM: CPT | Performed by: NURSE PRACTITIONER

## 2024-02-28 PROCEDURE — 87880 STREP A ASSAY W/OPTIC: CPT | Performed by: NURSE PRACTITIONER

## 2024-02-28 PROCEDURE — 87426 SARSCOV CORONAVIRUS AG IA: CPT | Performed by: NURSE PRACTITIONER

## 2024-02-28 RX ORDER — OSELTAMIVIR PHOSPHATE 75 MG/1
75 CAPSULE ORAL 2 TIMES DAILY
Qty: 10 CAPSULE | Refills: 0 | Status: SHIPPED | OUTPATIENT
Start: 2024-02-28 | End: 2024-03-04

## 2024-02-28 RX ORDER — DEXAMETHASONE SODIUM PHOSPHATE 10 MG/ML
10 INJECTION INTRAMUSCULAR; INTRAVENOUS ONCE
Status: COMPLETED | OUTPATIENT
Start: 2024-02-28 | End: 2024-02-28

## 2024-02-28 RX ADMIN — DEXAMETHASONE SODIUM PHOSPHATE 10 MG: 10 INJECTION INTRAMUSCULAR; INTRAVENOUS at 17:08

## 2024-02-28 NOTE — PROGRESS NOTES
including Tylenol prn pain/fever. Schedule f/u with PCP in 7-10 days if symptoms persist. ED sooner if symptoms worsen or change. ED immediately with high or refractory fever, progressive SOB, dyspnea, CP, calf pain/swelling, shaking chills, vomiting, abdominal pain, lethargy, flank pain, or decreased urinary output. Pt verbalizes understanding and is in agreement with plan of care. All questions answered.     Marcelino Valle, APRN - CNP    This visit was provided as a focused evaluation during the COVID -19 pandemic/national emergency.  A comprehensive review of all previous patient history and testing was not conducted.  Pertinent findings were elicited during the visit.     *NOTE: This report was transcribed using voice recognition software. Every effort was made to ensure accuracy; however, inadvertent computerized transcription errors may be present.

## 2024-07-29 DIAGNOSIS — F98.8 ATTENTION DEFICIT DISORDER (ADD) WITHOUT HYPERACTIVITY: Primary | ICD-10-CM

## 2024-07-29 RX ORDER — DEXTROAMPHETAMINE SACCHARATE, AMPHETAMINE ASPARTATE MONOHYDRATE, DEXTROAMPHETAMINE SULFATE AND AMPHETAMINE SULFATE 6.25; 6.25; 6.25; 6.25 MG/1; MG/1; MG/1; MG/1
25 CAPSULE, EXTENDED RELEASE ORAL DAILY
Qty: 30 CAPSULE | Refills: 0 | Status: SHIPPED | OUTPATIENT
Start: 2024-07-29 | End: 2024-08-28

## 2024-07-29 RX ORDER — DEXTROAMPHETAMINE SACCHARATE, AMPHETAMINE ASPARTATE MONOHYDRATE, DEXTROAMPHETAMINE SULFATE AND AMPHETAMINE SULFATE 6.25; 6.25; 6.25; 6.25 MG/1; MG/1; MG/1; MG/1
25 CAPSULE, EXTENDED RELEASE ORAL DAILY
Qty: 30 CAPSULE | Refills: 0 | Status: SHIPPED | OUTPATIENT
Start: 2024-09-27 | End: 2024-10-27

## 2024-07-29 RX ORDER — DEXTROAMPHETAMINE SACCHARATE, AMPHETAMINE ASPARTATE MONOHYDRATE, DEXTROAMPHETAMINE SULFATE AND AMPHETAMINE SULFATE 6.25; 6.25; 6.25; 6.25 MG/1; MG/1; MG/1; MG/1
25 CAPSULE, EXTENDED RELEASE ORAL DAILY
Qty: 30 CAPSULE | Refills: 0 | Status: SHIPPED | OUTPATIENT
Start: 2024-08-28 | End: 2024-09-27

## 2024-10-02 ENCOUNTER — OFFICE VISIT (OUTPATIENT)
Dept: FAMILY MEDICINE CLINIC | Age: 24
End: 2024-10-02
Payer: COMMERCIAL

## 2024-10-02 VITALS
BODY MASS INDEX: 29.69 KG/M2 | SYSTOLIC BLOOD PRESSURE: 110 MMHG | OXYGEN SATURATION: 99 % | TEMPERATURE: 98.4 F | HEIGHT: 68 IN | HEART RATE: 82 BPM | DIASTOLIC BLOOD PRESSURE: 62 MMHG | RESPIRATION RATE: 16 BRPM | WEIGHT: 195.9 LBS

## 2024-10-02 DIAGNOSIS — J30.89 SEASONAL ALLERGIC RHINITIS DUE TO OTHER ALLERGIC TRIGGER: ICD-10-CM

## 2024-10-02 DIAGNOSIS — J02.9 SORE THROAT: Primary | ICD-10-CM

## 2024-10-02 LAB — S PYO AG THROAT QL: NORMAL

## 2024-10-02 PROCEDURE — 87880 STREP A ASSAY W/OPTIC: CPT | Performed by: FAMILY MEDICINE

## 2024-10-02 PROCEDURE — 99213 OFFICE O/P EST LOW 20 MIN: CPT | Performed by: FAMILY MEDICINE

## 2024-10-02 RX ORDER — FLUTICASONE PROPIONATE 50 MCG
1 SPRAY, SUSPENSION (ML) NASAL DAILY
Qty: 16 G | Refills: 0 | Status: SHIPPED | OUTPATIENT
Start: 2024-10-02

## 2024-10-15 ASSESSMENT — ENCOUNTER SYMPTOMS
BLOOD IN STOOL: 0
COUGH: 0
PHOTOPHOBIA: 0
COLOR CHANGE: 0
VOMITING: 0
SINUS PAIN: 0
ABDOMINAL DISTENTION: 0
DIARRHEA: 0
FACIAL SWELLING: 0
APNEA: 0
CONSTIPATION: 0
CHEST TIGHTNESS: 0
RHINORRHEA: 0
SINUS PRESSURE: 0
SHORTNESS OF BREATH: 0

## 2024-10-15 NOTE — PROGRESS NOTES
Isadora Bolivar is a 24 y.o. female accompanied by herself  CC:   Chief Complaint   Patient presents with    Pharyngitis     Started this morning, hard to swallow, denies any other symptoms       Pharyngitis:  1 day  -Moderate intensity.  No radiation.  Sharp stabbing.  No other associated symptoms.  No radiation  No fevers  No chills  No cough      Patient's past medical, surgical, social and/or family history reviewed, updated in chart, and are non-contributory (unless otherwise stated).  Medications and allergies also reviewed and updated in chart.      /62 (Site: Left Upper Arm, Position: Sitting, Cuff Size: Large Adult)   Pulse 82   Temp 98.4 °F (36.9 °C) (Temporal)   Resp 16   Ht 1.727 m (5' 8\")   Wt 88.9 kg (195 lb 14.4 oz)   SpO2 99%   BMI 29.79 kg/m²     Review of Systems   Constitutional:  Negative for chills, fatigue and fever.   HENT:  Negative for congestion, ear pain, facial swelling, rhinorrhea, sinus pressure and sinus pain.    Eyes:  Negative for photophobia and visual disturbance.   Respiratory:  Negative for apnea, cough, chest tightness and shortness of breath.    Cardiovascular:  Negative for chest pain and palpitations.   Gastrointestinal:  Negative for abdominal distention, blood in stool, constipation, diarrhea and vomiting.   Endocrine: Negative for cold intolerance, polydipsia, polyphagia and polyuria.   Genitourinary:  Negative for decreased urine volume, frequency and urgency.   Musculoskeletal:  Negative for arthralgias and gait problem.   Skin:  Negative for color change.   Allergic/Immunologic: Negative for environmental allergies and food allergies.   Neurological:  Negative for dizziness, light-headedness and headaches.   Hematological:  Negative for adenopathy.   Psychiatric/Behavioral:  Negative for agitation and confusion.        Physical Exam  Constitutional:       Appearance: Normal appearance.   HENT:      Head: Normocephalic and atraumatic.      Right Ear: There

## 2024-11-19 DIAGNOSIS — F98.8 ATTENTION DEFICIT DISORDER (ADD) WITHOUT HYPERACTIVITY: ICD-10-CM

## 2024-11-19 DIAGNOSIS — R41.840 ATTENTION OR CONCENTRATION DEFICIT: Primary | ICD-10-CM

## 2024-11-19 RX ORDER — DEXTROAMPHETAMINE SACCHARATE, AMPHETAMINE ASPARTATE MONOHYDRATE, DEXTROAMPHETAMINE SULFATE AND AMPHETAMINE SULFATE 6.25; 6.25; 6.25; 6.25 MG/1; MG/1; MG/1; MG/1
25 CAPSULE, EXTENDED RELEASE ORAL DAILY
Qty: 30 CAPSULE | Refills: 0 | Status: CANCELLED | OUTPATIENT
Start: 2024-11-19 | End: 2024-12-19

## 2024-11-19 RX ORDER — DEXTROAMPHETAMINE SACCHARATE, AMPHETAMINE ASPARTATE MONOHYDRATE, DEXTROAMPHETAMINE SULFATE AND AMPHETAMINE SULFATE 6.25; 6.25; 6.25; 6.25 MG/1; MG/1; MG/1; MG/1
25 CAPSULE, EXTENDED RELEASE ORAL DAILY
Qty: 30 CAPSULE | Refills: 0 | OUTPATIENT
Start: 2024-12-19 | End: 2025-01-18

## 2024-11-19 RX ORDER — DEXTROAMPHETAMINE SACCHARATE, AMPHETAMINE ASPARTATE MONOHYDRATE, DEXTROAMPHETAMINE SULFATE AND AMPHETAMINE SULFATE 6.25; 6.25; 6.25; 6.25 MG/1; MG/1; MG/1; MG/1
25 CAPSULE, EXTENDED RELEASE ORAL DAILY
Qty: 30 CAPSULE | Refills: 0 | OUTPATIENT
Start: 2024-11-19 | End: 2024-12-19

## 2024-11-19 RX ORDER — DEXTROAMPHETAMINE SACCHARATE, AMPHETAMINE ASPARTATE MONOHYDRATE, DEXTROAMPHETAMINE SULFATE AND AMPHETAMINE SULFATE 6.25; 6.25; 6.25; 6.25 MG/1; MG/1; MG/1; MG/1
25 CAPSULE, EXTENDED RELEASE ORAL DAILY
Qty: 30 CAPSULE | Refills: 0 | OUTPATIENT
Start: 2025-01-18 | End: 2025-02-17

## 2024-11-19 NOTE — TELEPHONE ENCOUNTER
Name of Medication(s) Requested:  Requested Prescriptions     Pending Prescriptions Disp Refills    amphetamine-dextroamphetamine (ADDERALL XR) 25 MG extended release capsule 30 capsule 0     Sig: Take 1 capsule by mouth daily for 30 days. Max Daily Amount: 25 mg    amphetamine-dextroamphetamine (ADDERALL XR) 25 MG extended release capsule 30 capsule 0     Sig: Take 1 capsule by mouth daily for 30 days. Max Daily Amount: 25 mg    amphetamine-dextroamphetamine (ADDERALL XR) 25 MG extended release capsule 30 capsule 0     Sig: Take 1 capsule by mouth daily for 30 days. Max Daily Amount: 25 mg       Medication is on current medication list Yes    Dosage and directions were verified? Yes    Quantity verified: 30 day supply     Pharmacy Verified?  Yes    Last Appointment:  2/28/2024    Future appts:  No future appointments.     (If no appt send self scheduling link. .REFILLAPPT)  Scheduling request sent?     [] Yes  [x] No    Does patient need updated?  [] Yes  [x] No

## 2024-12-04 ENCOUNTER — OFFICE VISIT (OUTPATIENT)
Dept: FAMILY MEDICINE CLINIC | Age: 24
End: 2024-12-04
Payer: COMMERCIAL

## 2024-12-04 VITALS
HEART RATE: 68 BPM | DIASTOLIC BLOOD PRESSURE: 68 MMHG | HEIGHT: 68 IN | TEMPERATURE: 98 F | OXYGEN SATURATION: 96 % | RESPIRATION RATE: 16 BRPM | WEIGHT: 205 LBS | BODY MASS INDEX: 31.07 KG/M2 | SYSTOLIC BLOOD PRESSURE: 112 MMHG

## 2024-12-04 DIAGNOSIS — J30.89 SEASONAL ALLERGIC RHINITIS DUE TO OTHER ALLERGIC TRIGGER: ICD-10-CM

## 2024-12-04 DIAGNOSIS — M41.124 ADOLESCENT IDIOPATHIC SCOLIOSIS, THORACIC REGION: ICD-10-CM

## 2024-12-04 DIAGNOSIS — R41.840 ATTENTION OR CONCENTRATION DEFICIT: Primary | ICD-10-CM

## 2024-12-04 DIAGNOSIS — Z79.899 LONG TERM USE OF DRUG: ICD-10-CM

## 2024-12-04 PROCEDURE — 99213 OFFICE O/P EST LOW 20 MIN: CPT | Performed by: NURSE PRACTITIONER

## 2024-12-04 RX ORDER — DEXTROAMPHETAMINE SACCHARATE, AMPHETAMINE ASPARTATE MONOHYDRATE, DEXTROAMPHETAMINE SULFATE AND AMPHETAMINE SULFATE 6.25; 6.25; 6.25; 6.25 MG/1; MG/1; MG/1; MG/1
25 CAPSULE, EXTENDED RELEASE ORAL DAILY
Qty: 30 CAPSULE | Refills: 0 | Status: SHIPPED | OUTPATIENT
Start: 2024-12-04 | End: 2025-01-03

## 2024-12-04 RX ORDER — DEXTROAMPHETAMINE SACCHARATE, AMPHETAMINE ASPARTATE MONOHYDRATE, DEXTROAMPHETAMINE SULFATE AND AMPHETAMINE SULFATE 6.25; 6.25; 6.25; 6.25 MG/1; MG/1; MG/1; MG/1
25 CAPSULE, EXTENDED RELEASE ORAL DAILY
Qty: 30 CAPSULE | Refills: 0 | Status: SHIPPED | OUTPATIENT
Start: 2025-01-03 | End: 2025-02-02

## 2024-12-04 RX ORDER — DEXTROAMPHETAMINE SACCHARATE, AMPHETAMINE ASPARTATE MONOHYDRATE, DEXTROAMPHETAMINE SULFATE AND AMPHETAMINE SULFATE 6.25; 6.25; 6.25; 6.25 MG/1; MG/1; MG/1; MG/1
25 CAPSULE, EXTENDED RELEASE ORAL DAILY
Qty: 30 CAPSULE | Refills: 0 | Status: SHIPPED | OUTPATIENT
Start: 2025-02-02 | End: 2025-03-04

## 2024-12-04 SDOH — ECONOMIC STABILITY: FOOD INSECURITY: WITHIN THE PAST 12 MONTHS, THE FOOD YOU BOUGHT JUST DIDN'T LAST AND YOU DIDN'T HAVE MONEY TO GET MORE.: NEVER TRUE

## 2024-12-04 SDOH — ECONOMIC STABILITY: FOOD INSECURITY: WITHIN THE PAST 12 MONTHS, YOU WORRIED THAT YOUR FOOD WOULD RUN OUT BEFORE YOU GOT MONEY TO BUY MORE.: NEVER TRUE

## 2024-12-04 SDOH — ECONOMIC STABILITY: INCOME INSECURITY: HOW HARD IS IT FOR YOU TO PAY FOR THE VERY BASICS LIKE FOOD, HOUSING, MEDICAL CARE, AND HEATING?: NOT HARD AT ALL

## 2024-12-04 ASSESSMENT — ENCOUNTER SYMPTOMS
ABDOMINAL PAIN: 0
VOMITING: 0
BACK PAIN: 0
DIARRHEA: 0
CONSTIPATION: 0
NAUSEA: 0
SHORTNESS OF BREATH: 0
COUGH: 0
CHEST TIGHTNESS: 0
BLOOD IN STOOL: 0
WHEEZING: 0
TROUBLE SWALLOWING: 0
VOICE CHANGE: 0

## 2024-12-04 ASSESSMENT — PATIENT HEALTH QUESTIONNAIRE - PHQ9
SUM OF ALL RESPONSES TO PHQ QUESTIONS 1-9: 0
2. FEELING DOWN, DEPRESSED OR HOPELESS: NOT AT ALL
SUM OF ALL RESPONSES TO PHQ9 QUESTIONS 1 & 2: 0
SUM OF ALL RESPONSES TO PHQ QUESTIONS 1-9: 0
1. LITTLE INTEREST OR PLEASURE IN DOING THINGS: NOT AT ALL
SUM OF ALL RESPONSES TO PHQ QUESTIONS 1-9: 0
SUM OF ALL RESPONSES TO PHQ QUESTIONS 1-9: 0

## 2024-12-04 NOTE — PROGRESS NOTES
cervical adenopathy.   Skin:     General: Skin is warm and dry.      Capillary Refill: Capillary refill takes 2 to 3 seconds.      Findings: No bruising, lesion or rash.   Neurological:      General: No focal deficit present.      Mental Status: She is alert and oriented to person, place, and time.      Motor: No weakness.      Gait: Gait normal.   Psychiatric:         Attention and Perception: Attention normal.         Mood and Affect: Mood normal.         Behavior: Behavior normal.         Thought Content: Thought content does not include homicidal or suicidal ideation. Thought content does not include homicidal or suicidal plan.        Seen By:  DANNY Valdivia - CNP  *NOTE: This report was transcribed using voice recognition software. Every effort was made to ensure accuracy; however, inadvertent computerized transcription errors may be present.

## 2025-02-02 DIAGNOSIS — R41.840 ATTENTION OR CONCENTRATION DEFICIT: ICD-10-CM

## 2025-02-02 RX ORDER — DEXTROAMPHETAMINE SACCHARATE, AMPHETAMINE ASPARTATE MONOHYDRATE, DEXTROAMPHETAMINE SULFATE AND AMPHETAMINE SULFATE 6.25; 6.25; 6.25; 6.25 MG/1; MG/1; MG/1; MG/1
25 CAPSULE, EXTENDED RELEASE ORAL DAILY
Qty: 30 CAPSULE | Refills: 0 | Status: CANCELLED | OUTPATIENT
Start: 2025-02-02 | End: 2025-03-04

## 2025-02-03 RX ORDER — DEXTROAMPHETAMINE SACCHARATE, AMPHETAMINE ASPARTATE MONOHYDRATE, DEXTROAMPHETAMINE SULFATE AND AMPHETAMINE SULFATE 6.25; 6.25; 6.25; 6.25 MG/1; MG/1; MG/1; MG/1
25 CAPSULE, EXTENDED RELEASE ORAL DAILY
Qty: 30 CAPSULE | Refills: 0 | Status: SHIPPED | OUTPATIENT
Start: 2025-04-04 | End: 2025-05-04

## 2025-02-03 RX ORDER — DEXTROAMPHETAMINE SACCHARATE, AMPHETAMINE ASPARTATE MONOHYDRATE, DEXTROAMPHETAMINE SULFATE AND AMPHETAMINE SULFATE 6.25; 6.25; 6.25; 6.25 MG/1; MG/1; MG/1; MG/1
25 CAPSULE, EXTENDED RELEASE ORAL DAILY
Qty: 30 CAPSULE | Refills: 0 | Status: SHIPPED | OUTPATIENT
Start: 2025-03-05 | End: 2025-04-04

## 2025-02-03 RX ORDER — DEXTROAMPHETAMINE SACCHARATE, AMPHETAMINE ASPARTATE MONOHYDRATE, DEXTROAMPHETAMINE SULFATE AND AMPHETAMINE SULFATE 6.25; 6.25; 6.25; 6.25 MG/1; MG/1; MG/1; MG/1
25 CAPSULE, EXTENDED RELEASE ORAL DAILY
Qty: 30 CAPSULE | Refills: 0 | Status: SHIPPED | OUTPATIENT
Start: 2025-02-03 | End: 2025-03-05

## 2025-02-03 NOTE — TELEPHONE ENCOUNTER
Name of Medication(s) Requested:  Requested Prescriptions     Pending Prescriptions Disp Refills    amphetamine-dextroamphetamine (ADDERALL XR) 25 MG extended release capsule 30 capsule 0     Sig: Take 1 capsule by mouth daily for 30 days. Max Daily Amount: 25 mg       Medication is on current medication list Yes    Dosage and directions were verified? Yes    Quantity verified: 30 day supply     Pharmacy Verified?  Yes    Last Appointment:  12/4/2024    Future appts:  No future appointments. Will need  schedule her 3 month med check     (If no appt send self scheduling link. .REFILLAPPT)  Scheduling request sent?     [] Yes  [x] No    Does patient need updated?  [x] Yes  [] No

## 2025-03-25 ENCOUNTER — OFFICE VISIT (OUTPATIENT)
Dept: FAMILY MEDICINE CLINIC | Age: 25
End: 2025-03-25
Payer: COMMERCIAL

## 2025-03-25 VITALS
BODY MASS INDEX: 32.51 KG/M2 | HEIGHT: 67 IN | WEIGHT: 207.1 LBS | TEMPERATURE: 98.5 F | DIASTOLIC BLOOD PRESSURE: 58 MMHG | RESPIRATION RATE: 16 BRPM | OXYGEN SATURATION: 99 % | HEART RATE: 89 BPM | SYSTOLIC BLOOD PRESSURE: 102 MMHG

## 2025-03-25 DIAGNOSIS — J01.90 ACUTE BACTERIAL SINUSITIS: ICD-10-CM

## 2025-03-25 DIAGNOSIS — N92.6 ABNORMAL MENSTRUAL PERIODS: ICD-10-CM

## 2025-03-25 DIAGNOSIS — E55.9 VITAMIN D DEFICIENCY: ICD-10-CM

## 2025-03-25 DIAGNOSIS — Z98.890 HISTORY OF BILATERAL BREAST REDUCTION SURGERY: ICD-10-CM

## 2025-03-25 DIAGNOSIS — E53.8 VITAMIN B 12 DEFICIENCY: ICD-10-CM

## 2025-03-25 DIAGNOSIS — R41.840 ATTENTION OR CONCENTRATION DEFICIT: ICD-10-CM

## 2025-03-25 DIAGNOSIS — Z79.899 LONG TERM USE OF DRUG: ICD-10-CM

## 2025-03-25 DIAGNOSIS — Z00.00 GENERAL MEDICAL EXAMINATION: ICD-10-CM

## 2025-03-25 DIAGNOSIS — R63.5 WEIGHT GAIN: Primary | ICD-10-CM

## 2025-03-25 DIAGNOSIS — Z13.29 SCREENING FOR THYROID DISORDER: ICD-10-CM

## 2025-03-25 DIAGNOSIS — B96.89 ACUTE BACTERIAL SINUSITIS: ICD-10-CM

## 2025-03-25 DIAGNOSIS — Z13.220 SCREENING FOR LIPOID DISORDERS: ICD-10-CM

## 2025-03-25 PROCEDURE — 99214 OFFICE O/P EST MOD 30 MIN: CPT | Performed by: NURSE PRACTITIONER

## 2025-03-25 RX ORDER — DEXTROAMPHETAMINE SACCHARATE, AMPHETAMINE ASPARTATE MONOHYDRATE, DEXTROAMPHETAMINE SULFATE AND AMPHETAMINE SULFATE 6.25; 6.25; 6.25; 6.25 MG/1; MG/1; MG/1; MG/1
25 CAPSULE, EXTENDED RELEASE ORAL DAILY
Qty: 30 CAPSULE | Refills: 0 | Status: SHIPPED | OUTPATIENT
Start: 2025-05-24 | End: 2025-06-23

## 2025-03-25 RX ORDER — DEXTROAMPHETAMINE SACCHARATE, AMPHETAMINE ASPARTATE MONOHYDRATE, DEXTROAMPHETAMINE SULFATE AND AMPHETAMINE SULFATE 6.25; 6.25; 6.25; 6.25 MG/1; MG/1; MG/1; MG/1
25 CAPSULE, EXTENDED RELEASE ORAL DAILY
Qty: 30 CAPSULE | Refills: 0 | Status: SHIPPED | OUTPATIENT
Start: 2025-03-25 | End: 2025-04-24

## 2025-03-25 RX ORDER — DEXTROAMPHETAMINE SACCHARATE, AMPHETAMINE ASPARTATE MONOHYDRATE, DEXTROAMPHETAMINE SULFATE AND AMPHETAMINE SULFATE 6.25; 6.25; 6.25; 6.25 MG/1; MG/1; MG/1; MG/1
25 CAPSULE, EXTENDED RELEASE ORAL DAILY
Qty: 30 CAPSULE | Refills: 0 | Status: SHIPPED | OUTPATIENT
Start: 2025-04-24 | End: 2025-05-24

## 2025-03-25 SDOH — ECONOMIC STABILITY: FOOD INSECURITY: WITHIN THE PAST 12 MONTHS, YOU WORRIED THAT YOUR FOOD WOULD RUN OUT BEFORE YOU GOT MONEY TO BUY MORE.: NEVER TRUE

## 2025-03-25 SDOH — ECONOMIC STABILITY: FOOD INSECURITY: WITHIN THE PAST 12 MONTHS, THE FOOD YOU BOUGHT JUST DIDN'T LAST AND YOU DIDN'T HAVE MONEY TO GET MORE.: NEVER TRUE

## 2025-03-25 SDOH — ECONOMIC STABILITY: TRANSPORTATION INSECURITY
IN THE PAST 12 MONTHS, HAS THE LACK OF TRANSPORTATION KEPT YOU FROM MEDICAL APPOINTMENTS OR FROM GETTING MEDICATIONS?: NO

## 2025-03-25 SDOH — ECONOMIC STABILITY: INCOME INSECURITY: IN THE LAST 12 MONTHS, WAS THERE A TIME WHEN YOU WERE NOT ABLE TO PAY THE MORTGAGE OR RENT ON TIME?: NO

## 2025-03-25 ASSESSMENT — ENCOUNTER SYMPTOMS
WHEEZING: 0
VOMITING: 0
TROUBLE SWALLOWING: 0
CONSTIPATION: 0
NAUSEA: 0
DIARRHEA: 0
ABDOMINAL PAIN: 0
CHEST TIGHTNESS: 0
VOICE CHANGE: 0
COUGH: 0
SHORTNESS OF BREATH: 0
BLOOD IN STOOL: 0
BACK PAIN: 0

## 2025-03-25 ASSESSMENT — PATIENT HEALTH QUESTIONNAIRE - PHQ9
SUM OF ALL RESPONSES TO PHQ9 QUESTIONS 1 & 2: 0
1. LITTLE INTEREST OR PLEASURE IN DOING THINGS: NOT AT ALL
1. LITTLE INTEREST OR PLEASURE IN DOING THINGS: NOT AT ALL
2. FEELING DOWN, DEPRESSED OR HOPELESS: NOT AT ALL
SUM OF ALL RESPONSES TO PHQ QUESTIONS 1-9: 0
1. LITTLE INTEREST OR PLEASURE IN DOING THINGS: NOT AT ALL
SUM OF ALL RESPONSES TO PHQ QUESTIONS 1-9: 0
SUM OF ALL RESPONSES TO PHQ QUESTIONS 1-9: 0
2. FEELING DOWN, DEPRESSED OR HOPELESS: NOT AT ALL
SUM OF ALL RESPONSES TO PHQ QUESTIONS 1-9: 0
2. FEELING DOWN, DEPRESSED OR HOPELESS: NOT AT ALL
SUM OF ALL RESPONSES TO PHQ QUESTIONS 1-9: 0

## 2025-03-25 NOTE — PROGRESS NOTES
SpO2: 99%   Weight: 93.9 kg (207 lb 1.6 oz)   Height: 1.702 m (5' 7\")       EXAM     Physical Exam  Constitutional:       Appearance: Normal appearance. She is normal weight.   HENT:      Head: Normocephalic.      Nose: Nose normal.   Eyes:      Pupils: Pupils are equal, round, and reactive to light.   Cardiovascular:      Rate and Rhythm: Normal rate and regular rhythm.   Pulmonary:      Effort: Pulmonary effort is normal.      Breath sounds: Normal breath sounds. No wheezing.   Chest:      Chest wall: No tenderness.   Abdominal:      General: Abdomen is flat. Bowel sounds are normal.      Palpations: Abdomen is soft.   Musculoskeletal:         General: Tenderness present. No swelling or deformity. Normal range of motion.      Cervical back: Normal range of motion and neck supple.   Skin:     General: Skin is warm and dry.   Neurological:      General: No focal deficit present.      Mental Status: She is alert. Mental status is at baseline.   Psychiatric:         Mood and Affect: Mood normal.       Physical Exam  Mouth/Throat: Moist mucous membranes, no erythema or exudate  Respiratory: Clear to auscultation, no wheezing, rales, or rhonchi          Seen By:  DANNY Valdivia CNP  *NOTE: This report was transcribed using voice recognition software. Every effort was made to ensure accuracy; however, inadvertent computerized transcription errors may be present.

## 2025-03-25 NOTE — PATIENT INSTRUCTIONS
[place] [employer] - Financial Resources*  (Call [employer]/211 if need more resources.)       Utility:  [employer]  What they offer: Limited assistance to restore/ prevent utility disconnection.  Phone Number: [phone]  Address: [facility] [place] [address]  Website: Experiment  [facility] Action Program  Utility assistance  [phone]  [facility]  Utility assistance   [phone]  [employer] [facility]  Utility assistance  [phone]  Financial or Medical  HELP NETWORK OF [facility]:  What they do: Provides 24-hr, 7 days a week access to information on community resources for financial help. [facility] AND [facility]  Phone: 211 or [phone]            [facility] JOB AND FAMILY SERVICES:  MAIN [facility] LINE FOR ALL [facility]: [phone]  What they do: [place] works first with temporary cash assistance if there are children in household.   [facility] [employer]: [address] [place] [address]  Phone: [phone], [phone]  [facility] [employer]: [address] [place] [address]  Phone: [phone]  [facility] DJFS: [address] [place] [address]  Phone: [phone]  Website: s.[place].[facility]    [employer] Financial Assistance  What they offer: Assistance with [employer] bills  Phone: [phone], option #5   Medications:  [facility]  What they offer: [employer] tracks prescription drug prices and provides free drug coupons for discounts on medications.  Website: https://www.[employer]  [employer]  What they offer: [employer] offers free information on medications and healthcare cost savings programs including prescription assistance programs, coupons, and discount programs.  [employer]: 453.296.1320  Website: https://www.needSelectHubeds.org    [employer]  What they offer: Information about free and low-cost medicine programs.  Website:

## 2025-03-31 ENCOUNTER — LAB (OUTPATIENT)
Dept: FAMILY MEDICINE CLINIC | Age: 25
End: 2025-03-31
Payer: COMMERCIAL

## 2025-03-31 DIAGNOSIS — N92.6 ABNORMAL MENSTRUAL PERIODS: ICD-10-CM

## 2025-03-31 DIAGNOSIS — Z79.899 LONG TERM USE OF DRUG: ICD-10-CM

## 2025-03-31 DIAGNOSIS — E53.8 VITAMIN B 12 DEFICIENCY: ICD-10-CM

## 2025-03-31 DIAGNOSIS — E55.9 VITAMIN D DEFICIENCY: ICD-10-CM

## 2025-03-31 DIAGNOSIS — R63.5 WEIGHT GAIN: ICD-10-CM

## 2025-03-31 DIAGNOSIS — Z13.220 SCREENING FOR LIPOID DISORDERS: ICD-10-CM

## 2025-03-31 DIAGNOSIS — Z00.00 GENERAL MEDICAL EXAMINATION: ICD-10-CM

## 2025-03-31 DIAGNOSIS — Z13.29 SCREENING FOR THYROID DISORDER: ICD-10-CM

## 2025-03-31 LAB
ALBUMIN: 4 G/DL (ref 3.5–5.2)
ALP BLD-CCNC: 58 U/L (ref 35–104)
ALT SERPL-CCNC: 17 U/L (ref 0–32)
ANION GAP SERPL CALCULATED.3IONS-SCNC: 15 MMOL/L (ref 7–16)
AST SERPL-CCNC: 21 U/L (ref 0–31)
BASOPHILS ABSOLUTE: 0.05 K/UL (ref 0–0.2)
BASOPHILS RELATIVE PERCENT: 1 % (ref 0–2)
BILIRUB SERPL-MCNC: 0.4 MG/DL (ref 0–1.2)
BUN BLDV-MCNC: 13 MG/DL (ref 6–20)
CALCIUM SERPL-MCNC: 9 MG/DL (ref 8.6–10.2)
CHLORIDE BLD-SCNC: 106 MMOL/L (ref 98–107)
CHOLESTEROL, TOTAL: 160 MG/DL
CO2: 20 MMOL/L (ref 22–29)
CORTISOL COLLECTION INFO: NORMAL
CORTISOL: 11.4 UG/DL (ref 2.7–18.4)
CREAT SERPL-MCNC: 1.1 MG/DL (ref 0.5–1)
EOSINOPHILS ABSOLUTE: 0.18 K/UL (ref 0.05–0.5)
EOSINOPHILS RELATIVE PERCENT: 4 % (ref 0–6)
GFR, ESTIMATED: 72 ML/MIN/1.73M2
GLUCOSE FASTING: 85 MG/DL (ref 74–99)
HCT VFR BLD CALC: 39.1 % (ref 34–48)
HDLC SERPL-MCNC: 54 MG/DL
HEMOGLOBIN: 13 G/DL (ref 11.5–15.5)
IMMATURE GRANULOCYTES %: 0 % (ref 0–5)
IMMATURE GRANULOCYTES ABSOLUTE: <0.03 K/UL (ref 0–0.58)
LDL CHOLESTEROL: 94 MG/DL
LYMPHOCYTES ABSOLUTE: 1.86 K/UL (ref 1.5–4)
LYMPHOCYTES RELATIVE PERCENT: 38 % (ref 20–42)
MCH RBC QN AUTO: 28.8 PG (ref 26–35)
MCHC RBC AUTO-ENTMCNC: 33.2 G/DL (ref 32–34.5)
MCV RBC AUTO: 86.5 FL (ref 80–99.9)
MONOCYTES ABSOLUTE: 0.29 K/UL (ref 0.1–0.95)
MONOCYTES RELATIVE PERCENT: 6 % (ref 2–12)
NEUTROPHILS ABSOLUTE: 2.48 K/UL (ref 1.8–7.3)
NEUTROPHILS RELATIVE PERCENT: 51 % (ref 43–80)
PDW BLD-RTO: 12.8 % (ref 11.5–15)
PLATELET # BLD: 198 K/UL (ref 130–450)
PMV BLD AUTO: 12.1 FL (ref 7–12)
POTASSIUM SERPL-SCNC: 4.5 MMOL/L (ref 3.5–5)
RBC # BLD: 4.52 M/UL (ref 3.5–5.5)
SODIUM BLD-SCNC: 141 MMOL/L (ref 132–146)
T3 FREE: 3.14 PG/ML (ref 2–4.4)
THYROXINE (T4): 6 UG/DL (ref 4.5–11.7)
TOTAL PROTEIN: 6.4 G/DL (ref 6.4–8.3)
TRIGL SERPL-MCNC: 60 MG/DL
TSH SERPL DL<=0.05 MIU/L-ACNC: 1.51 UIU/ML (ref 0.27–4.2)
VITAMIN B-12: 533 PG/ML (ref 211–946)
VITAMIN D 25-HYDROXY: 18 NG/ML (ref 30–100)
VLDLC SERPL CALC-MCNC: 12 MG/DL
WBC # BLD: 4.9 K/UL (ref 4.5–11.5)

## 2025-03-31 PROCEDURE — 36415 COLL VENOUS BLD VENIPUNCTURE: CPT | Performed by: NURSE PRACTITIONER

## 2025-04-01 ENCOUNTER — RESULTS FOLLOW-UP (OUTPATIENT)
Dept: FAMILY MEDICINE CLINIC | Age: 25
End: 2025-04-01

## 2025-04-01 LAB
FOLLICLE STIMULATING HORMONE: 1.9 MIU/ML
LH: 5.6 MIU/ML
TESTOSTERONE TOTAL: 39 NG/DL (ref 8–48)

## 2025-04-01 NOTE — RESULT ENCOUNTER NOTE
PLEASE CALL PATIENT AND DISCUSS THE RESULTS WITH THEM NO NEW ORDERS    Vitamin D is low lets the only thing I can think of that could be causing the fatigue I am not quite sure about the weight gain

## 2025-04-01 NOTE — TELEPHONE ENCOUNTER
----- Message from Marcelino Valle, DANNY - CNP sent at 4/1/2025  8:15 AM EDT -----  PLEASE CALL PATIENT AND DISCUSS THE RESULTS WITH THEM NO NEW ORDERS    Vitamin D is low lets the only thing I can think of that could be causing the fatigue I am not quite sure about the weight gain

## 2025-04-02 LAB — PROGESTERONE LEVEL: 11.4 NG/ML

## 2025-04-07 LAB
ESTRADIOL LEVEL: 176.3 PG/ML
ESTROGEN TOTAL: 271.9 PG/ML
ESTRONE: 95.6 PG/ML

## 2025-06-26 ENCOUNTER — OFFICE VISIT (OUTPATIENT)
Dept: FAMILY MEDICINE CLINIC | Age: 25
End: 2025-06-26
Payer: COMMERCIAL

## 2025-06-26 VITALS
SYSTOLIC BLOOD PRESSURE: 106 MMHG | HEIGHT: 68 IN | RESPIRATION RATE: 16 BRPM | TEMPERATURE: 98.2 F | BODY MASS INDEX: 31.78 KG/M2 | DIASTOLIC BLOOD PRESSURE: 70 MMHG | HEART RATE: 88 BPM | OXYGEN SATURATION: 99 % | WEIGHT: 209.7 LBS

## 2025-06-26 DIAGNOSIS — M41.124 ADOLESCENT IDIOPATHIC SCOLIOSIS, THORACIC REGION: ICD-10-CM

## 2025-06-26 DIAGNOSIS — R41.840 ATTENTION OR CONCENTRATION DEFICIT: Primary | ICD-10-CM

## 2025-06-26 DIAGNOSIS — R63.5 WEIGHT GAIN: ICD-10-CM

## 2025-06-26 DIAGNOSIS — E53.8 VITAMIN B 12 DEFICIENCY: ICD-10-CM

## 2025-06-26 PROCEDURE — 99213 OFFICE O/P EST LOW 20 MIN: CPT | Performed by: NURSE PRACTITIONER

## 2025-06-26 RX ORDER — NALTREXONE HYDROCHLORIDE 50 MG/1
50 TABLET, FILM COATED ORAL DAILY
Qty: 30 TABLET | Refills: 2 | Status: SHIPPED | OUTPATIENT
Start: 2025-06-26

## 2025-06-26 RX ORDER — DEXTROAMPHETAMINE SACCHARATE, AMPHETAMINE ASPARTATE MONOHYDRATE, DEXTROAMPHETAMINE SULFATE AND AMPHETAMINE SULFATE 7.5; 7.5; 7.5; 7.5 MG/1; MG/1; MG/1; MG/1
30 CAPSULE, EXTENDED RELEASE ORAL DAILY
Qty: 30 CAPSULE | Refills: 0 | Status: SHIPPED | OUTPATIENT
Start: 2025-06-26 | End: 2025-07-26

## 2025-06-26 RX ORDER — DEXTROAMPHETAMINE SACCHARATE, AMPHETAMINE ASPARTATE MONOHYDRATE, DEXTROAMPHETAMINE SULFATE AND AMPHETAMINE SULFATE 7.5; 7.5; 7.5; 7.5 MG/1; MG/1; MG/1; MG/1
30 CAPSULE, EXTENDED RELEASE ORAL DAILY
Qty: 30 CAPSULE | Refills: 0 | Status: SHIPPED | OUTPATIENT
Start: 2025-07-26 | End: 2025-08-25

## 2025-06-26 RX ORDER — DEXTROAMPHETAMINE SACCHARATE, AMPHETAMINE ASPARTATE MONOHYDRATE, DEXTROAMPHETAMINE SULFATE AND AMPHETAMINE SULFATE 7.5; 7.5; 7.5; 7.5 MG/1; MG/1; MG/1; MG/1
30 CAPSULE, EXTENDED RELEASE ORAL DAILY
Qty: 30 CAPSULE | Refills: 0 | Status: SHIPPED | OUTPATIENT
Start: 2025-08-25 | End: 2025-09-24

## 2025-06-26 ASSESSMENT — ENCOUNTER SYMPTOMS
VOICE CHANGE: 0
BLOOD IN STOOL: 0
WHEEZING: 0
NAUSEA: 0
SHORTNESS OF BREATH: 0
DIARRHEA: 0
CONSTIPATION: 0
ABDOMINAL PAIN: 0
VOMITING: 0
TROUBLE SWALLOWING: 0
CHEST TIGHTNESS: 0
BACK PAIN: 0
COUGH: 0

## 2025-06-26 NOTE — PROGRESS NOTES
Vital Sign     Unable to Pay for Housing in the Last Year: No     Number of Times Moved in the Last Year: 0     Homeless in the Last Year: No       Vitals:    06/26/25 1430   BP: 106/70   BP Site: Left Upper Arm   Patient Position: Sitting   BP Cuff Size: Large Adult   Pulse: 88   Resp: 16   Temp: 98.2 °F (36.8 °C)   TempSrc: Temporal   SpO2: 99%   Weight: 95.1 kg (209 lb 11.2 oz)   Height: 1.727 m (5' 8\")       EXAM     Physical Exam  Constitutional:       Appearance: Normal appearance. She is normal weight.   HENT:      Head: Normocephalic.      Nose: Nose normal.   Eyes:      Pupils: Pupils are equal, round, and reactive to light.   Cardiovascular:      Rate and Rhythm: Normal rate and regular rhythm.   Pulmonary:      Effort: Pulmonary effort is normal.      Breath sounds: Normal breath sounds. No wheezing.   Chest:      Chest wall: No tenderness.   Abdominal:      General: Abdomen is flat. Bowel sounds are normal.      Palpations: Abdomen is soft.   Musculoskeletal:         General: No swelling, tenderness or deformity. Normal range of motion.      Cervical back: Normal range of motion and neck supple.   Skin:     General: Skin is warm and dry.   Neurological:      General: No focal deficit present.      Mental Status: She is alert. Mental status is at baseline.   Psychiatric:         Mood and Affect: Mood normal.       Physical Exam  Mouth/Throat: Moist mucous membranes, no erythema or exudate  Respiratory: Clear to auscultation, no wheezing, rales, or rhonchi          Seen By:  DANNY Valdivia CNP  *NOTE: This report was transcribed using voice recognition software. Every effort was made to ensure accuracy; however, inadvertent computerized transcription errors may be present.

## 2025-08-11 DIAGNOSIS — R41.840 ATTENTION OR CONCENTRATION DEFICIT: ICD-10-CM

## 2025-08-11 RX ORDER — DEXTROAMPHETAMINE SACCHARATE, AMPHETAMINE ASPARTATE MONOHYDRATE, DEXTROAMPHETAMINE SULFATE AND AMPHETAMINE SULFATE 7.5; 7.5; 7.5; 7.5 MG/1; MG/1; MG/1; MG/1
30 CAPSULE, EXTENDED RELEASE ORAL DAILY
Qty: 30 CAPSULE | Refills: 0 | Status: SHIPPED | OUTPATIENT
Start: 2025-08-11 | End: 2025-09-10

## 2025-08-11 RX ORDER — DEXTROAMPHETAMINE SACCHARATE, AMPHETAMINE ASPARTATE MONOHYDRATE, DEXTROAMPHETAMINE SULFATE AND AMPHETAMINE SULFATE 7.5; 7.5; 7.5; 7.5 MG/1; MG/1; MG/1; MG/1
30 CAPSULE, EXTENDED RELEASE ORAL DAILY
Qty: 30 CAPSULE | Refills: 0 | Status: SHIPPED | OUTPATIENT
Start: 2025-10-10 | End: 2025-11-09

## 2025-08-11 RX ORDER — DEXTROAMPHETAMINE SACCHARATE, AMPHETAMINE ASPARTATE MONOHYDRATE, DEXTROAMPHETAMINE SULFATE AND AMPHETAMINE SULFATE 7.5; 7.5; 7.5; 7.5 MG/1; MG/1; MG/1; MG/1
30 CAPSULE, EXTENDED RELEASE ORAL DAILY
Qty: 30 CAPSULE | Refills: 0 | Status: SHIPPED | OUTPATIENT
Start: 2025-09-10 | End: 2025-10-10

## 2025-08-11 RX ORDER — DEXTROAMPHETAMINE SACCHARATE, AMPHETAMINE ASPARTATE MONOHYDRATE, DEXTROAMPHETAMINE SULFATE AND AMPHETAMINE SULFATE 7.5; 7.5; 7.5; 7.5 MG/1; MG/1; MG/1; MG/1
30 CAPSULE, EXTENDED RELEASE ORAL DAILY
Qty: 30 CAPSULE | Refills: 0 | Status: CANCELLED | OUTPATIENT
Start: 2025-08-11 | End: 2025-09-10